# Patient Record
Sex: MALE | Race: WHITE | Employment: OTHER | URBAN - METROPOLITAN AREA
[De-identification: names, ages, dates, MRNs, and addresses within clinical notes are randomized per-mention and may not be internally consistent; named-entity substitution may affect disease eponyms.]

---

## 2017-03-16 ENCOUNTER — EMPLOYEE WELLNESS (OUTPATIENT)
Dept: OTHER | Age: 67
End: 2017-03-16

## 2017-03-16 LAB
CHOLESTEROL/HDL RATIO: 2
CHOLESTEROL: 155 MG/DL
GLUCOSE BLD-MCNC: 102 MG/DL (ref 70–99)
HDLC SERPL-MCNC: 78 MG/DL
LDL CHOLESTEROL: 64 MG/DL (ref 0–130)
PATIENT FASTING?: YES
TRIGL SERPL-MCNC: 64 MG/DL
VLDLC SERPL CALC-MCNC: 13 MG/DL (ref 1–30)

## 2017-06-15 ENCOUNTER — TELEPHONE (OUTPATIENT)
Dept: FAMILY MEDICINE CLINIC | Age: 67
End: 2017-06-15

## 2017-06-15 DIAGNOSIS — Z12.11 ENCOUNTER FOR SCREENING COLONOSCOPY: Primary | ICD-10-CM

## 2017-06-15 RX ORDER — LEVOTHYROXINE SODIUM 175 UG/1
175 TABLET ORAL
Qty: 90 TABLET | Refills: 1 | Status: SHIPPED | OUTPATIENT
Start: 2017-06-15 | End: 2017-11-21 | Stop reason: SDUPTHER

## 2017-07-12 ENCOUNTER — NURSE ONLY (OUTPATIENT)
Dept: FAMILY MEDICINE CLINIC | Age: 67
End: 2017-07-12
Payer: COMMERCIAL

## 2017-07-12 DIAGNOSIS — Z23 NEED FOR 23-POLYVALENT PNEUMOCOCCAL POLYSACCHARIDE VACCINE: Primary | ICD-10-CM

## 2017-07-12 PROCEDURE — 90732 PPSV23 VACC 2 YRS+ SUBQ/IM: CPT | Performed by: FAMILY MEDICINE

## 2017-07-12 PROCEDURE — 90471 IMMUNIZATION ADMIN: CPT | Performed by: FAMILY MEDICINE

## 2017-09-26 RX ORDER — ATORVASTATIN CALCIUM 40 MG/1
TABLET, FILM COATED ORAL
Qty: 90 TABLET | Refills: 0 | Status: SHIPPED | OUTPATIENT
Start: 2017-09-26 | End: 2017-11-15 | Stop reason: SDUPTHER

## 2017-11-02 LAB
ALT SERPL-CCNC: 28 U/L
AST SERPL-CCNC: 26 U/L
BASOPHILS ABSOLUTE: NORMAL /ΜL
BASOPHILS RELATIVE PERCENT: NORMAL %
BUN BLDV-MCNC: 13 MG/DL
CALCIUM SERPL-MCNC: 9.2 MG/DL
CHLORIDE BLD-SCNC: 101 MMOL/L
CHOLESTEROL, TOTAL: 155 MG/DL
CHOLESTEROL/HDL RATIO: 2
CO2: 22 MMOL/L
CREAT SERPL-MCNC: 1.08 MG/DL
EOSINOPHILS ABSOLUTE: NORMAL /ΜL
EOSINOPHILS RELATIVE PERCENT: NORMAL %
GFR CALCULATED: >60
GLUCOSE BLD-MCNC: 97 MG/DL
HCT VFR BLD CALC: 45.4 % (ref 41–53)
HDLC SERPL-MCNC: 79 MG/DL (ref 35–70)
HEMOGLOBIN: 15 G/DL (ref 13.5–17.5)
LDL CHOLESTEROL CALCULATED: 64 MG/DL (ref 0–160)
LYMPHOCYTES ABSOLUTE: NORMAL /ΜL
LYMPHOCYTES RELATIVE PERCENT: NORMAL %
MCH RBC QN AUTO: 28.7 PG
MCHC RBC AUTO-ENTMCNC: 33 G/DL
MCV RBC AUTO: 86.8 FL
MONOCYTES ABSOLUTE: NORMAL /ΜL
MONOCYTES RELATIVE PERCENT: NORMAL %
NEUTROPHILS ABSOLUTE: NORMAL /ΜL
NEUTROPHILS RELATIVE PERCENT: NORMAL %
PDW BLD-RTO: 13.5 %
PLATELET # BLD: 269 K/ΜL
PMV BLD AUTO: NORMAL FL
POTASSIUM SERPL-SCNC: 4.3 MMOL/L
PROSTATE SPECIFIC ANTIGEN: 2.47 NG/ML
RBC # BLD: 5.23 10^6/ΜL
SODIUM BLD-SCNC: 140 MMOL/L
TRIGL SERPL-MCNC: 58 MG/DL
VLDLC SERPL CALC-MCNC: ABNORMAL MG/DL
WBC # BLD: 8.6 10^3/ML

## 2017-11-10 LAB — TSH SERPL DL<=0.05 MIU/L-ACNC: 10.14 UIU/ML

## 2017-11-15 ENCOUNTER — OFFICE VISIT (OUTPATIENT)
Dept: FAMILY MEDICINE CLINIC | Age: 67
End: 2017-11-15
Payer: COMMERCIAL

## 2017-11-15 VITALS
SYSTOLIC BLOOD PRESSURE: 138 MMHG | DIASTOLIC BLOOD PRESSURE: 78 MMHG | WEIGHT: 199 LBS | BODY MASS INDEX: 29.47 KG/M2 | HEIGHT: 69 IN

## 2017-11-15 DIAGNOSIS — Z13.6 SCREENING FOR AAA (ABDOMINAL AORTIC ANEURYSM): ICD-10-CM

## 2017-11-15 DIAGNOSIS — E03.9 ACQUIRED HYPOTHYROIDISM: Primary | ICD-10-CM

## 2017-11-15 DIAGNOSIS — Z12.11 SCREEN FOR COLON CANCER: ICD-10-CM

## 2017-11-15 DIAGNOSIS — K27.9 PUD (PEPTIC ULCER DISEASE): ICD-10-CM

## 2017-11-15 DIAGNOSIS — B35.1 ONYCHOMYCOSIS: ICD-10-CM

## 2017-11-15 DIAGNOSIS — Z87.19 HISTORY OF ESOPHAGEAL STRICTURE: ICD-10-CM

## 2017-11-15 DIAGNOSIS — E78.00 PURE HYPERCHOLESTEROLEMIA: ICD-10-CM

## 2017-11-15 PROCEDURE — 99214 OFFICE O/P EST MOD 30 MIN: CPT | Performed by: FAMILY MEDICINE

## 2017-11-15 PROCEDURE — 3017F COLORECTAL CA SCREEN DOC REV: CPT | Performed by: FAMILY MEDICINE

## 2017-11-15 PROCEDURE — G8419 CALC BMI OUT NRM PARAM NOF/U: HCPCS | Performed by: FAMILY MEDICINE

## 2017-11-15 PROCEDURE — G8427 DOCREV CUR MEDS BY ELIG CLIN: HCPCS | Performed by: FAMILY MEDICINE

## 2017-11-15 PROCEDURE — 4040F PNEUMOC VAC/ADMIN/RCVD: CPT | Performed by: FAMILY MEDICINE

## 2017-11-15 PROCEDURE — G8484 FLU IMMUNIZE NO ADMIN: HCPCS | Performed by: FAMILY MEDICINE

## 2017-11-15 PROCEDURE — 1123F ACP DISCUSS/DSCN MKR DOCD: CPT | Performed by: FAMILY MEDICINE

## 2017-11-15 PROCEDURE — 1036F TOBACCO NON-USER: CPT | Performed by: FAMILY MEDICINE

## 2017-11-15 RX ORDER — SUCRALFATE 1 G/1
1 TABLET ORAL 4 TIMES DAILY
Qty: 360 TABLET | Refills: 1 | Status: SHIPPED | OUTPATIENT
Start: 2017-11-15 | End: 2019-01-07 | Stop reason: ALTCHOICE

## 2017-11-15 RX ORDER — ATORVASTATIN CALCIUM 40 MG/1
TABLET, FILM COATED ORAL
Qty: 90 TABLET | Refills: 1 | Status: SHIPPED | OUTPATIENT
Start: 2017-11-15 | End: 2018-04-09 | Stop reason: SDUPTHER

## 2017-11-15 RX ORDER — PANTOPRAZOLE SODIUM 40 MG/1
40 TABLET, DELAYED RELEASE ORAL 2 TIMES DAILY
Qty: 180 TABLET | Refills: 1 | Status: SHIPPED | OUTPATIENT
Start: 2017-11-15 | End: 2018-06-13 | Stop reason: SDUPTHER

## 2017-11-15 ASSESSMENT — ENCOUNTER SYMPTOMS: RESPIRATORY NEGATIVE: 1

## 2017-11-15 NOTE — PROGRESS NOTES
Taking? Authorizing Provider   atorvastatin (LIPITOR) 40 MG tablet Take 1 tablet by mouth daily 11/15/17  Yes Paulino Cordon DO   sucralfate (CARAFATE) 1 GM tablet Take 1 tablet by mouth 4 times daily 11/15/17 2/13/18 Yes Paulino Cordon DO   pantoprazole (PROTONIX) 40 MG tablet Take 1 tablet by mouth 2 times daily 11/15/17  Yes Paulino Cordon DO   ciclopirox (PENLAC) 8 % solution Apply topically nightly. 11/15/17  Yes Paulino Cordon DO   levothyroxine (SYNTHROID) 175 MCG tablet Take 1 tablet by mouth every morning (before breakfast) 6/15/17  Yes Paulino Cordon DO   ranitidine (ZANTAC) 150 MG tablet Take 150 mg by mouth nightly   Yes Historical Provider, MD   diclofenac sodium (VOLTAREN) 1 % GEL Apply 2 g topically 4 times daily as needed for Pain 11/22/16  Yes Paulino Cordon DO   Nerve Stimulator (EMJOI TENS) JOSI Use as directed. Include 2 sets of pads, with 3 refills  Indications: Lower Backache 11/28/11  Yes Aris Mitchell MD        Allergies:       Erythromycin and Isosorbide nitrate    Social History:     Tobacco:    reports that he quit smoking about 2 years ago. He has quit using smokeless tobacco.  Alcohol:      reports that he drinks alcohol. Drug Use:  reports that he does not use drugs. Family History:     Family History   Problem Relation Age of Onset    Lupus Father     Heart Disease Father     Heart Attack Father     High Cholesterol Brother        Review of Systems:     Positive and Negative as described in HPI    Review of Systems   Constitutional: Negative. Respiratory: Negative. Cardiovascular: Negative. Physical Exam:     Vitals:  /78   Ht 5' 9\" (1.753 m)   Wt 199 lb (90.3 kg)   BMI 29.39 kg/m²   Physical Exam   Constitutional: He is oriented to person, place, and time. He appears well-developed and well-nourished. He is cooperative.    HENT:   Right Ear: Hearing and tympanic membrane normal.   Left Ear: Hearing and tympanic membrane normal.   Nose: Nose normal.   Mouth/Throat: Oropharynx is clear and moist and mucous membranes are normal. Normal dentition. Eyes: Conjunctivae and EOM are normal. Pupils are equal, round, and reactive to light. Neck: No thyroid mass and no thyromegaly present. Cardiovascular: Normal rate, regular rhythm, S1 normal and S2 normal.    No murmur heard. No peripheral edema. Pulmonary/Chest: Effort normal and breath sounds normal.   Abdominal: Soft. Bowel sounds are normal. There is no hepatosplenomegaly. There is no tenderness. Musculoskeletal:   Muscles of normal tone and bulk. Normal gait. Lymphadenopathy:     He has no cervical adenopathy. Neurological: He is alert and oriented to person, place, and time. He has normal strength. Skin: Skin is warm and dry. No rash noted. Examine toenails of right foot but not left foot. Right toenails all thickened, opaque, yellow, with onycholysis at the free edge. Psychiatric: He has a normal mood and affect. His behavior is normal.   Nursing note and vitals reviewed. Data:     Lab Results   Component Value Date     11/02/2017    K 4.3 11/02/2017     11/02/2017    CO2 22 11/02/2017    BUN 13 11/02/2017    CREATININE 1.08 11/02/2017    GLUCOSE 97 11/02/2017    GLUCOSE 94 11/09/2011    LABALBU 4.2 12/20/2012    LABALBU 3.9 11/09/2011    BILITOT 1.16 12/20/2012    ALKPHOS 77 12/20/2012    AST 26 11/02/2017    ALT 28 11/02/2017     Lab Results   Component Value Date    WBC 8.6 11/02/2017    RBC 5.23 11/02/2017    RBC 4.91 11/09/2011    HGB 15.0 11/02/2017    HCT 45.4 11/02/2017    MCV 86.8 11/02/2017    MCH 28.7 11/02/2017    MCHC 33.0 11/02/2017    RDW 13.5 11/02/2017     11/02/2017     11/09/2011    MPV NOT REPORTED 10/21/2016     Lab Results   Component Value Date    TSH 10.14 11/10/2017     Lab Results   Component Value Date    CHOL 155 11/02/2017    HDL 79 11/02/2017    PSA 2.47 11/02/2017         Assessment & Plan:       1.  Acquired hypothyroidism  TSH with Reflex   2. Pure hypercholesterolemia     3. PUD (peptic ulcer disease)  Isabella Macias MD, General Surgery [facility]   4. History of esophageal stricture  Su Bosch MD, General Surgery [facility]   5. Onychomycosis     6. Screen for colon cancer  Su Bosch MD, General Surgery [facility]   7. Screening for AAA (abdominal aortic aneurysm)  US screening for AAA   Hypothyroid and controlled per recent TSH. Free T4 was not done. Patient is already on Synthroid dose that is above expected weight-based dosing. Weight-based dosing would be about 125 µg daily. For this reason, I'm hesitant to increase his dose. Advised instead that he take the Synthroid on its own, waiting about an hour before taking his proton pump inhibitor and Carafate. Recheck in 6 weeks. Hyperlipidemia in great control since starting Lipitor. Continue at current dose. Peptic ulcer disease which is been pretty severe and associated with esophageal stricture in the past.  Patient not having any symptoms of dysphagia now but is due for repeat EGD. Continue current medications and referred to surgeon for scope. penlac for toenails. Wash off with alcohol once a week. Requested Prescriptions     Signed Prescriptions Disp Refills    atorvastatin (LIPITOR) 40 MG tablet 90 tablet 1     Sig: Take 1 tablet by mouth daily    sucralfate (CARAFATE) 1 GM tablet 360 tablet 1     Sig: Take 1 tablet by mouth 4 times daily    pantoprazole (PROTONIX) 40 MG tablet 180 tablet 1     Sig: Take 1 tablet by mouth 2 times daily    ciclopirox (PENLAC) 8 % solution 1 Bottle 5     Sig: Apply topically nightly. Patient Instructions     SURVEY:    You may be receiving a survey from [employer] regarding your visit today. Please complete the survey to enable us to provide the highest quality of care to you and your family.     If you cannot score us as very good on any question, please call the office to

## 2017-11-15 NOTE — PATIENT INSTRUCTIONS
SURVEY:    You may be receiving a survey from [employer] regarding your visit today. Please complete the survey to enable us to provide the highest quality of care to you and your family. If you cannot score us as very good on any question, please call the office to discuss how we could have made your experience exceptional.     Thank you.

## 2017-11-21 RX ORDER — LEVOTHYROXINE SODIUM 175 UG/1
175 TABLET ORAL
Qty: 90 TABLET | Refills: 1 | Status: SHIPPED | OUTPATIENT
Start: 2017-11-21 | End: 2018-06-13 | Stop reason: SDUPTHER

## 2017-11-28 ENCOUNTER — HOSPITAL ENCOUNTER (OUTPATIENT)
Dept: ULTRASOUND IMAGING | Age: 67
Discharge: HOME | End: 2017-11-28
Payer: COMMERCIAL

## 2017-11-28 DIAGNOSIS — Z13.6 SCREENING FOR AAA (ABDOMINAL AORTIC ANEURYSM): ICD-10-CM

## 2017-11-28 PROCEDURE — 76706 US ABDL AORTA SCREEN AAA: CPT

## 2017-12-14 ENCOUNTER — INITIAL CONSULT (OUTPATIENT)
Dept: SURGERY | Age: 67
End: 2017-12-14

## 2017-12-14 VITALS
HEIGHT: 69 IN | WEIGHT: 201 LBS | BODY MASS INDEX: 29.77 KG/M2 | SYSTOLIC BLOOD PRESSURE: 117 MMHG | HEART RATE: 58 BPM | DIASTOLIC BLOOD PRESSURE: 70 MMHG

## 2017-12-14 DIAGNOSIS — Z01.818 PREPROCEDURAL EXAMINATION: ICD-10-CM

## 2017-12-14 DIAGNOSIS — Z87.11 HISTORY OF STOMACH ULCERS: ICD-10-CM

## 2017-12-14 DIAGNOSIS — Z12.11 ENCOUNTER FOR SCREENING COLONOSCOPY: Primary | ICD-10-CM

## 2017-12-14 DIAGNOSIS — Z87.19 HISTORY OF ESOPHAGITIS: ICD-10-CM

## 2017-12-14 PROCEDURE — 99024 POSTOP FOLLOW-UP VISIT: CPT | Performed by: SURGERY

## 2017-12-14 RX ORDER — SODIUM, POTASSIUM,MAG SULFATES 17.5-3.13G
1 SOLUTION, RECONSTITUTED, ORAL ORAL ONCE
Qty: 2 BOTTLE | Refills: 0 | Status: SHIPPED | OUTPATIENT
Start: 2017-12-14 | End: 2017-12-14

## 2018-01-15 ENCOUNTER — HOSPITAL ENCOUNTER (OUTPATIENT)
Age: 68
Discharge: HOME | End: 2018-01-15
Payer: COMMERCIAL

## 2018-01-15 DIAGNOSIS — Z12.11 ENCOUNTER FOR SCREENING COLONOSCOPY: ICD-10-CM

## 2018-01-15 DIAGNOSIS — Z01.818 PREPROCEDURAL EXAMINATION: ICD-10-CM

## 2018-01-15 LAB
EKG ATRIAL RATE: 61 BPM
EKG P AXIS: 58 DEGREES
EKG P-R INTERVAL: 162 MS
EKG Q-T INTERVAL: 410 MS
EKG QRS DURATION: 86 MS
EKG QTC CALCULATION (BAZETT): 412 MS
EKG R AXIS: -44 DEGREES
EKG T AXIS: 35 DEGREES
EKG VENTRICULAR RATE: 61 BPM

## 2018-01-15 PROCEDURE — 93005 ELECTROCARDIOGRAM TRACING: CPT

## 2018-01-16 ENCOUNTER — ANESTHESIA EVENT (OUTPATIENT)
Dept: OPERATING ROOM | Age: 68
End: 2018-01-16
Payer: COMMERCIAL

## 2018-01-22 ENCOUNTER — HOSPITAL ENCOUNTER (OUTPATIENT)
Age: 68
Setting detail: OUTPATIENT SURGERY
Discharge: HOME | End: 2018-01-22
Attending: SURGERY | Admitting: SURGERY
Payer: COMMERCIAL

## 2018-01-22 ENCOUNTER — ANESTHESIA (OUTPATIENT)
Dept: OPERATING ROOM | Age: 68
End: 2018-01-22
Payer: COMMERCIAL

## 2018-01-22 VITALS
HEIGHT: 68 IN | WEIGHT: 196 LBS | RESPIRATION RATE: 16 BRPM | DIASTOLIC BLOOD PRESSURE: 75 MMHG | TEMPERATURE: 96.7 F | OXYGEN SATURATION: 100 % | BODY MASS INDEX: 29.7 KG/M2 | HEART RATE: 48 BPM | SYSTOLIC BLOOD PRESSURE: 140 MMHG

## 2018-01-22 VITALS — SYSTOLIC BLOOD PRESSURE: 104 MMHG | OXYGEN SATURATION: 97 % | DIASTOLIC BLOOD PRESSURE: 66 MMHG

## 2018-01-22 PROBLEM — Z12.11 ENCOUNTER FOR SCREENING COLONOSCOPY: Status: ACTIVE | Noted: 2018-01-22

## 2018-01-22 PROCEDURE — 2580000003 HC RX 258: Performed by: SURGERY

## 2018-01-22 PROCEDURE — G0121 COLON CA SCRN NOT HI RSK IND: HCPCS | Performed by: SURGERY

## 2018-01-22 PROCEDURE — 6360000002 HC RX W HCPCS: Performed by: NURSE ANESTHETIST, CERTIFIED REGISTERED

## 2018-01-22 PROCEDURE — 88342 IMHCHEM/IMCYTCHM 1ST ANTB: CPT

## 2018-01-22 PROCEDURE — 3609012400 HC EGD TRANSORAL BIOPSY SINGLE/MULTIPLE: Performed by: SURGERY

## 2018-01-22 PROCEDURE — 7100000010 HC PHASE II RECOVERY - FIRST 15 MIN: Performed by: SURGERY

## 2018-01-22 PROCEDURE — 88305 TISSUE EXAM BY PATHOLOGIST: CPT

## 2018-01-22 PROCEDURE — 36415 COLL VENOUS BLD VENIPUNCTURE: CPT

## 2018-01-22 PROCEDURE — 3700000001 HC ADD 15 MINUTES (ANESTHESIA): Performed by: SURGERY

## 2018-01-22 PROCEDURE — 87077 CULTURE AEROBIC IDENTIFY: CPT

## 2018-01-22 PROCEDURE — 7100000011 HC PHASE II RECOVERY - ADDTL 15 MIN: Performed by: SURGERY

## 2018-01-22 PROCEDURE — 43239 EGD BIOPSY SINGLE/MULTIPLE: CPT | Performed by: SURGERY

## 2018-01-22 PROCEDURE — 6360000002 HC RX W HCPCS

## 2018-01-22 PROCEDURE — 82941 ASSAY OF GASTRIN: CPT

## 2018-01-22 PROCEDURE — 3700000000 HC ANESTHESIA ATTENDED CARE: Performed by: SURGERY

## 2018-01-22 PROCEDURE — 3609027000 HC COLONOSCOPY: Performed by: SURGERY

## 2018-01-22 RX ORDER — SODIUM CHLORIDE, SODIUM LACTATE, POTASSIUM CHLORIDE, CALCIUM CHLORIDE 600; 310; 30; 20 MG/100ML; MG/100ML; MG/100ML; MG/100ML
INJECTION, SOLUTION INTRAVENOUS CONTINUOUS
Status: DISCONTINUED | OUTPATIENT
Start: 2018-01-22 | End: 2018-01-22 | Stop reason: HOSPADM

## 2018-01-22 RX ORDER — 0.9 % SODIUM CHLORIDE 0.9 %
10 VIAL (ML) INJECTION EVERY 12 HOURS SCHEDULED
Status: DISCONTINUED | OUTPATIENT
Start: 2018-01-22 | End: 2018-01-22 | Stop reason: HOSPADM

## 2018-01-22 RX ORDER — MIDAZOLAM HYDROCHLORIDE 1 MG/ML
INJECTION INTRAMUSCULAR; INTRAVENOUS PRN
Status: DISCONTINUED | OUTPATIENT
Start: 2018-01-22 | End: 2018-01-22 | Stop reason: SDUPTHER

## 2018-01-22 RX ORDER — ACETAMINOPHEN 325 MG/1
650 TABLET ORAL EVERY 4 HOURS PRN
Status: DISCONTINUED | OUTPATIENT
Start: 2018-01-22 | End: 2018-01-22 | Stop reason: HOSPADM

## 2018-01-22 RX ORDER — ONDANSETRON 2 MG/ML
4 INJECTION INTRAMUSCULAR; INTRAVENOUS EVERY 6 HOURS PRN
Status: DISCONTINUED | OUTPATIENT
Start: 2018-01-22 | End: 2018-01-22 | Stop reason: HOSPADM

## 2018-01-22 RX ORDER — PROPOFOL 10 MG/ML
INJECTION, EMULSION INTRAVENOUS PRN
Status: DISCONTINUED | OUTPATIENT
Start: 2018-01-22 | End: 2018-01-22 | Stop reason: SDUPTHER

## 2018-01-22 RX ORDER — SODIUM CHLORIDE 0.9 % (FLUSH) 0.9 %
10 SYRINGE (ML) INJECTION PRN
Status: DISCONTINUED | OUTPATIENT
Start: 2018-01-22 | End: 2018-01-22 | Stop reason: HOSPADM

## 2018-01-22 RX ORDER — PROPOFOL 10 MG/ML
INJECTION, EMULSION INTRAVENOUS CONTINUOUS PRN
Status: DISCONTINUED | OUTPATIENT
Start: 2018-01-22 | End: 2018-01-22 | Stop reason: SDUPTHER

## 2018-01-22 RX ORDER — SODIUM CHLORIDE 0.9 % (FLUSH) 0.9 %
10 SYRINGE (ML) INJECTION EVERY 12 HOURS SCHEDULED
Status: DISCONTINUED | OUTPATIENT
Start: 2018-01-22 | End: 2018-01-22 | Stop reason: HOSPADM

## 2018-01-22 RX ORDER — 0.9 % SODIUM CHLORIDE 0.9 %
10 VIAL (ML) INJECTION PRN
Status: DISCONTINUED | OUTPATIENT
Start: 2018-01-22 | End: 2018-01-22 | Stop reason: HOSPADM

## 2018-01-22 RX ADMIN — MIDAZOLAM HYDROCHLORIDE 2 MG: 2 INJECTION, SOLUTION INTRAMUSCULAR; INTRAVENOUS at 08:14

## 2018-01-22 RX ADMIN — PROPOFOL 140 MCG/KG/MIN: 10 INJECTION, EMULSION INTRAVENOUS at 08:19

## 2018-01-22 RX ADMIN — PROPOFOL 50 MG: 10 INJECTION, EMULSION INTRAVENOUS at 08:17

## 2018-01-22 RX ADMIN — SODIUM CHLORIDE, POTASSIUM CHLORIDE, SODIUM LACTATE AND CALCIUM CHLORIDE: 600; 310; 30; 20 INJECTION, SOLUTION INTRAVENOUS at 07:44

## 2018-01-22 ASSESSMENT — ENCOUNTER SYMPTOMS
ABDOMINAL PAIN: 1
SORE THROAT: 0
NAUSEA: 0
COUGH: 0
BACK PAIN: 1
SHORTNESS OF BREATH: 0
CHOKING: 0
TROUBLE SWALLOWING: 0
VOMITING: 0
BLOOD IN STOOL: 0

## 2018-01-22 NOTE — PATIENT INSTRUCTIONS
Patient Education   Patient Education        Learning About Colonoscopy  What is a colonoscopy? A colonoscopy is a test (also called a procedure) that lets a doctor look inside your large intestine. The doctor uses a thin, lighted tube called a colonoscope. The doctor uses it to look for small growths called polyps, colon or rectal cancer (colorectal cancer), or other problems like bleeding. During the procedure, the doctor can take samples of tissue. The samples can then be checked for cancer or other conditions. The doctor can also take out polyps. How is colonoscopy done? This procedure is done in a doctor's office or a clinic or hospital. You will get medicine to help you relax and not feel pain. Some people find that they do not remember having the test because of the medicine. The doctor gently moves the colonoscope, or scope, through the colon. The scope is also a small video camera. It lets the doctor see the colon and take pictures. A colonoscopy usually takes 30 to 45 minutes. It may take longer if the doctor has to remove polyps. How do you prepare for the procedure? You need to clean out your colon before the procedure so the doctor can see all of your colon. You may start the cleaning process a day or two before the test. This depends on which \"colon prep\" your doctor recommends. To clean your colon, you stop eating solid foods and drink only clear liquids. You can have water, tea, coffee, clear juices, clear broths, flavored ice pops, and gelatin (such as Jell-O). Do not drink anything red or purple, such as grape juice or fruit punch. And do not eat red or purple foods, such as grape ice pops or cherry gelatin. The day or night before the procedure, you drink a large amount of a special liquid. This causes loose, frequent stools. You will go to the bathroom a lot. It is very important to drink all of the colon prep liquid. If you have problems drinking the liquid, call your doctor.   For that bends. It is called an endoscope, or scope. The doctor puts the tip of the scope in your mouth and gently moves it down your throat. The scope is a flexible video camera. The doctor looks at a monitor (like a TV set or a computer screen) as he or she moves the scope. A doctor may do this test, which is also called a procedure, to look for ulcers, tumors, infection, or bleeding. It also can be used to look for signs of acid backing up into your esophagus. This is called gastroesophageal reflux disease, or GERD. The doctor can use the scope to take a sample of tissue for study (a biopsy). The doctor also can use the scope to take out growths or stop bleeding. Follow-up care is a key part of your treatment and safety. Be sure to make and go to all appointments, and call your doctor if you are having problems. It's also a good idea to know your test results and keep a list of the medicines you take. What happens before the procedure? ?Preparing for the procedure  ? · Understand exactly what procedure is planned, along with the risks, benefits, and other options. · Tell your doctors ALL the medicines, vitamins, supplements, and herbal remedies you take. Some of these can increase the risk of bleeding or interact with anesthesia. ? · If you take blood thinners, such as warfarin (Coumadin), clopidogrel (Plavix), or aspirin, be sure to talk to your doctor. He or she will tell you if you should stop taking these medicines before your procedure. Make sure that you understand exactly what your doctor wants you to do.   ? · Your doctor will tell you which medicines to take or stop before your procedure. You may need to stop taking certain medicines a week or more before the procedure. So talk to your doctor as soon as you can.   ? · If you have an advance directive, let your doctor know. It may include a living will and a durable power of  for health care.  Bring a copy to the hospital. If you don't have

## 2018-01-22 NOTE — BRIEF OP NOTE
Brief Postoperative Note  ______________________________________________________________    Patient: Bren Major  YOB: 1950  MRN: [id number]  Date of Procedure: 1/22/2018    Pre-Op Diagnosis:      1. Screening colonoscopy     2. History gastritis, esophagitis    Post-Op Diagnosis:      1. Mild antral gastritis     2. Small hiatal hernia     3. Early sigmoid diverticulosis     4. Large perianal skin tag       Procedure(s):      1. EGD     2. Antral biopsies     3. Colonoscopy anus to cecum    Anesthesia: Anesthesia type not filed in the log. Surgeon(s):  Homa Bruno MD    Staff:  Domonique Pérez [employer]: Carolynn Jones     Estimated Blood Loss:  Less than 42EP    Complications: None    Specimens:        1. Antral biopsies    Findings:   As above. Continue PPI. Check gastrin. Consider excision of perianal skin tag.     Dictated # [id number]    Homa Bruno MD  Date: 1/22/2018  Time: 8:17 AM

## 2018-01-22 NOTE — PROGRESS NOTES
Mouth/Throat: Oropharynx is clear and moist.   Eyes: Conjunctivae and EOM are normal. Pupils are equal, round, and reactive to light. No scleral icterus. Neck: Normal range of motion. Neck supple. No JVD present. No tracheal deviation present. Cardiovascular: Normal rate and regular rhythm. Pulmonary/Chest: Effort normal and breath sounds normal. No respiratory distress. He exhibits no tenderness. Abdominal: Soft. Bowel sounds are normal. He exhibits no distension and no mass. There is no tenderness. There is no rebound and no guarding. Musculoskeletal: Normal range of motion. He exhibits no edema. Lymphadenopathy:     He has no cervical adenopathy. Neurological: He is alert and oriented to person, place, and time. No cranial nerve deficit. Skin: Skin is warm and dry. No rash noted. No erythema. Psychiatric: He has a normal mood and affect. His behavior is normal. Judgment and thought content normal.   Nursing note and vitals reviewed. 10/24/2016 11:55 AM - [place], Lab Incoming [employer] Results     Component Collected Lab   Surgical Pathology Report 10/21/2016  3:18 PM [address] [place] [address] Lab   [address] [place] [address]   CONSULTING PATHOLOGISTS [address] [place] [address]. [place], [address]   [phone]   Fax: [phone]     2 [facility]     Patient Name: Gerhardt Sabine [facility] Rec: [id number]   Path Number: [id number]   Collected: 10/21/2016   Received: 10/21/2016   Reported: 10/24/2016 11:55     -- Diagnosis --   GE JUNCTION, BIOPSY:       -  SQUAMOUS AND GLANDULAR MUCOSA PRESENT.       -  MILD CHRONIC INFLAMMATION.        Peter Jacome M.D.   **Electronically Signed Out**         als/10/24/2016       Clinical Information   Pre-op Diagnosis:  PEPTIC ULCER DISEASE; ESOPHAGEAL STENOSIS   Operative Findings:  BX'S  GE JUNCTION   Operation Performed:  EGD WITH BX'S & DILATION       Source of Specimen   1:

## 2018-01-22 NOTE — H&P
Family History   Problem Relation Age of Onset    Lupus Father      Heart Disease Father      Heart Attack Father      High Cholesterol Brother              Allergies:  See list     Current Facility-Administered Medications   Current Outpatient Prescriptions   Medication Sig Dispense Refill    levothyroxine (SYNTHROID) 175 MCG tablet Take 1 tablet by mouth every morning (before breakfast) 90 tablet 1    atorvastatin (LIPITOR) 40 MG tablet Take 1 tablet by mouth daily 90 tablet 1    sucralfate (CARAFATE) 1 GM tablet Take 1 tablet by mouth 4 times daily 360 tablet 1    pantoprazole (PROTONIX) 40 MG tablet Take 1 tablet by mouth 2 times daily 180 tablet 1    ciclopirox (PENLAC) 8 % solution Apply topically nightly. 1 Bottle 5    ranitidine (ZANTAC) 150 MG tablet Take 150 mg by mouth nightly        diclofenac sodium (VOLTAREN) 1 % GEL Apply 2 g topically 4 times daily as needed for Pain 100 g 0    Nerve Stimulator (EMJOI TENS) JOSI Use as directed.   Include 2 sets of pads, with 3 refills  Indications: Lower Backache 1 Device 0      No current facility-administered medications for this visit.                 Facility-Administered Medications Ordered in Other Visits   Medication Dose Route Frequency Provider Last Rate Last Dose    lactated ringers infusion   Intravenous Continuous Melody Esteban MD        pantoprazole (PROTONIX) injection 40 mg  40 mg Intravenous Once Melody Esteban MD                Social History   Social History            Social History    Marital status:        Spouse name: N/A    Number of children: N/A    Years of education: N/A             Social History Main Topics    Smoking status: Former Smoker       Quit date: 8/1/2015    Smokeless tobacco: Former User    Alcohol use 0.0 oz/week         Comment: 6 beers a week    Drug use: No    Sexual activity: Not Asked           Other Topics Concern    None          Social History Narrative    None       Objective:   Physical Exam   Constitutional: He is oriented to person, place, and time. He appears well-developed and well-nourished. HENT:   Head: Normocephalic and atraumatic. Mouth/Throat: Oropharynx is clear and moist.   Eyes: Conjunctivae and EOM are normal. Pupils are equal, round, and reactive to light. No scleral icterus. Neck: Normal range of motion. Neck supple. No JVD present. No tracheal deviation present. Cardiovascular: Normal rate and regular rhythm. Pulmonary/Chest: Effort normal and breath sounds normal. No respiratory distress. He exhibits no tenderness. Abdominal: Soft. Bowel sounds are normal. He exhibits no distension and no mass. There is no tenderness. There is no rebound and no guarding. Musculoskeletal: Normal range of motion. He exhibits no edema. Lymphadenopathy:     He has no cervical adenopathy. Neurological: He is alert and oriented to person, place, and time. No cranial nerve deficit. Skin: Skin is warm and dry. No rash noted. No erythema. Psychiatric: He has a normal mood and affect. His behavior is normal. Judgment and thought content normal.   Nursing note and vitals reviewed.     10/24/2016 11:55 AM - [place], Lab [employer]      Component Results      Component Collected Lab   Surgical Pathology Report 10/21/2016  3:18 PM [address] Lab   [address] [place] [address]   CONSULTING PATHOLOGISTS [address] [place] [address]. [place], [address]   [phone]   Fax: [phone]     3 [facility]     Patient Name: Myla Corado Med Rec: [id number]   Path Number: [id number]   Collected: 10/21/2016   Received: 10/21/2016   Reported: 10/24/2016 11:55     -- Diagnosis --   GE JUNCTION, BIOPSY:       -  SQUAMOUS AND GLANDULAR MUCOSA PRESENT.       -  MILD CHRONIC INFLAMMATION.        Robert Sage M.D.   **Electronically Signed Out**         als/10/24/2016       Clinical

## 2018-01-22 NOTE — OP NOTE
with Dr. Inga Stark at his next  regular appointment. My thanks to Dr. Inga Stark for the consultation.         CONNOR Berkowitz    D: 01/22/2018 8:51:00       T: 01/22/2018 11:39:10     [email or web address]  Job#: [id number]     Doc#: [id number]    CC:  Sarah Villeda

## 2018-01-23 LAB
DIRECT EXAM: NEGATIVE
DIRECT EXAM: NORMAL
DIRECT EXAM: NORMAL
Lab: NORMAL
SPECIMEN DESCRIPTION: NORMAL
STATUS: NORMAL

## 2018-01-24 LAB
GASTRIN: 16 PG/ML (ref 0–100)
SURGICAL PATHOLOGY REPORT: NORMAL

## 2018-01-29 ENCOUNTER — ANESTHESIA EVENT (OUTPATIENT)
Dept: OPERATING ROOM | Age: 68
End: 2018-01-29
Payer: COMMERCIAL

## 2018-01-29 ENCOUNTER — ANESTHESIA (OUTPATIENT)
Dept: OPERATING ROOM | Age: 68
End: 2018-01-29
Payer: COMMERCIAL

## 2018-01-29 ENCOUNTER — HOSPITAL ENCOUNTER (OUTPATIENT)
Age: 68
Setting detail: OUTPATIENT SURGERY
Discharge: HOME | End: 2018-01-29
Attending: SURGERY | Admitting: SURGERY
Payer: COMMERCIAL

## 2018-01-29 VITALS
HEIGHT: 68 IN | OXYGEN SATURATION: 97 % | SYSTOLIC BLOOD PRESSURE: 155 MMHG | RESPIRATION RATE: 16 BRPM | TEMPERATURE: 97.7 F | DIASTOLIC BLOOD PRESSURE: 79 MMHG | WEIGHT: 196 LBS | HEART RATE: 52 BPM | BODY MASS INDEX: 29.7 KG/M2

## 2018-01-29 VITALS
RESPIRATION RATE: 9 BRPM | SYSTOLIC BLOOD PRESSURE: 109 MMHG | DIASTOLIC BLOOD PRESSURE: 60 MMHG | OXYGEN SATURATION: 96 % | TEMPERATURE: 97.7 F

## 2018-01-29 DIAGNOSIS — K64.4 SKIN TAG OF PERIANAL REGION: Primary | ICD-10-CM

## 2018-01-29 PROCEDURE — 7100000011 HC PHASE II RECOVERY - ADDTL 15 MIN: Performed by: SURGERY

## 2018-01-29 PROCEDURE — 3600000013 HC SURGERY LEVEL 3 ADDTL 15MIN: Performed by: SURGERY

## 2018-01-29 PROCEDURE — 88304 TISSUE EXAM BY PATHOLOGIST: CPT

## 2018-01-29 PROCEDURE — 2580000003 HC RX 258: Performed by: SURGERY

## 2018-01-29 PROCEDURE — 46945 INT HRHC LIG 1 HROID W/O IMG: CPT | Performed by: SURGERY

## 2018-01-29 PROCEDURE — 7100000010 HC PHASE II RECOVERY - FIRST 15 MIN: Performed by: SURGERY

## 2018-01-29 PROCEDURE — 3700000000 HC ANESTHESIA ATTENDED CARE: Performed by: SURGERY

## 2018-01-29 PROCEDURE — 2500000003 HC RX 250 WO HCPCS: Performed by: SURGERY

## 2018-01-29 PROCEDURE — 3700000001 HC ADD 15 MINUTES (ANESTHESIA): Performed by: SURGERY

## 2018-01-29 PROCEDURE — 3600000003 HC SURGERY LEVEL 3 BASE: Performed by: SURGERY

## 2018-01-29 PROCEDURE — 6360000002 HC RX W HCPCS: Performed by: SURGERY

## 2018-01-29 PROCEDURE — 6360000002 HC RX W HCPCS: Performed by: NURSE ANESTHETIST, CERTIFIED REGISTERED

## 2018-01-29 PROCEDURE — 2500000003 HC RX 250 WO HCPCS: Performed by: NURSE ANESTHETIST, CERTIFIED REGISTERED

## 2018-01-29 RX ORDER — KETOROLAC TROMETHAMINE 30 MG/ML
INJECTION, SOLUTION INTRAMUSCULAR; INTRAVENOUS PRN
Status: DISCONTINUED | OUTPATIENT
Start: 2018-01-29 | End: 2018-01-29 | Stop reason: SDUPTHER

## 2018-01-29 RX ORDER — LIDOCAINE HYDROCHLORIDE 10 MG/ML
INJECTION, SOLUTION EPIDURAL; INFILTRATION; INTRACAUDAL; PERINEURAL PRN
Status: DISCONTINUED | OUTPATIENT
Start: 2018-01-29 | End: 2018-01-29 | Stop reason: SDUPTHER

## 2018-01-29 RX ORDER — ACETAMINOPHEN 325 MG/1
650 TABLET ORAL EVERY 4 HOURS PRN
Status: DISCONTINUED | OUTPATIENT
Start: 2018-01-29 | End: 2018-01-29 | Stop reason: HOSPADM

## 2018-01-29 RX ORDER — BUPIVACAINE HYDROCHLORIDE 2.5 MG/ML
INJECTION, SOLUTION INFILTRATION; PERINEURAL PRN
Status: DISCONTINUED | OUTPATIENT
Start: 2018-01-29 | End: 2018-01-29 | Stop reason: HOSPADM

## 2018-01-29 RX ORDER — FENTANYL CITRATE 50 UG/ML
INJECTION, SOLUTION INTRAMUSCULAR; INTRAVENOUS PRN
Status: DISCONTINUED | OUTPATIENT
Start: 2018-01-29 | End: 2018-01-29 | Stop reason: SDUPTHER

## 2018-01-29 RX ORDER — SODIUM CHLORIDE, SODIUM LACTATE, POTASSIUM CHLORIDE, CALCIUM CHLORIDE 600; 310; 30; 20 MG/100ML; MG/100ML; MG/100ML; MG/100ML
INJECTION, SOLUTION INTRAVENOUS CONTINUOUS
Status: DISCONTINUED | OUTPATIENT
Start: 2018-01-29 | End: 2018-01-29 | Stop reason: HOSPADM

## 2018-01-29 RX ORDER — MORPHINE SULFATE 1 MG/ML
1 INJECTION, SOLUTION EPIDURAL; INTRATHECAL; INTRAVENOUS
Status: DISCONTINUED | OUTPATIENT
Start: 2018-01-29 | End: 2018-01-29 | Stop reason: HOSPADM

## 2018-01-29 RX ORDER — MIDAZOLAM HYDROCHLORIDE 1 MG/ML
INJECTION INTRAMUSCULAR; INTRAVENOUS PRN
Status: DISCONTINUED | OUTPATIENT
Start: 2018-01-29 | End: 2018-01-29 | Stop reason: SDUPTHER

## 2018-01-29 RX ORDER — SODIUM CHLORIDE 0.9 % (FLUSH) 0.9 %
10 SYRINGE (ML) INJECTION EVERY 12 HOURS SCHEDULED
Status: DISCONTINUED | OUTPATIENT
Start: 2018-01-29 | End: 2018-01-29 | Stop reason: HOSPADM

## 2018-01-29 RX ORDER — ONDANSETRON 2 MG/ML
4 INJECTION INTRAMUSCULAR; INTRAVENOUS EVERY 6 HOURS PRN
Status: DISCONTINUED | OUTPATIENT
Start: 2018-01-29 | End: 2018-01-29 | Stop reason: HOSPADM

## 2018-01-29 RX ORDER — SODIUM CHLORIDE 0.9 % (FLUSH) 0.9 %
10 SYRINGE (ML) INJECTION PRN
Status: DISCONTINUED | OUTPATIENT
Start: 2018-01-29 | End: 2018-01-29 | Stop reason: HOSPADM

## 2018-01-29 RX ORDER — HYDROCODONE BITARTRATE AND ACETAMINOPHEN 5; 325 MG/1; MG/1
TABLET ORAL
Qty: 20 TABLET | Refills: 0 | Status: SHIPPED | OUTPATIENT
Start: 2018-01-29 | End: 2018-02-01

## 2018-01-29 RX ORDER — PROPOFOL 10 MG/ML
INJECTION, EMULSION INTRAVENOUS CONTINUOUS PRN
Status: DISCONTINUED | OUTPATIENT
Start: 2018-01-29 | End: 2018-01-29 | Stop reason: SDUPTHER

## 2018-01-29 RX ADMIN — PROPOFOL 75 MCG/KG/MIN: 10 INJECTION, EMULSION INTRAVENOUS at 11:10

## 2018-01-29 RX ADMIN — FENTANYL CITRATE 50 MCG: 50 INJECTION INTRAMUSCULAR; INTRAVENOUS at 11:10

## 2018-01-29 RX ADMIN — MIDAZOLAM 2 MG: 1 INJECTION INTRAMUSCULAR; INTRAVENOUS at 10:55

## 2018-01-29 RX ADMIN — SODIUM CHLORIDE, POTASSIUM CHLORIDE, SODIUM LACTATE AND CALCIUM CHLORIDE: 600; 310; 30; 20 INJECTION, SOLUTION INTRAVENOUS at 09:00

## 2018-01-29 RX ADMIN — LIDOCAINE HYDROCHLORIDE 4 ML: 10 INJECTION, SOLUTION EPIDURAL; INFILTRATION; INTRACAUDAL; PERINEURAL at 11:05

## 2018-01-29 RX ADMIN — KETOROLAC TROMETHAMINE 30 MG: 30 INJECTION, SOLUTION INTRAMUSCULAR at 11:30

## 2018-01-29 ASSESSMENT — PAIN SCALES - GENERAL
PAINLEVEL_OUTOF10: 0

## 2018-01-29 NOTE — ANESTHESIA PRE PROCEDURE
Department of Anesthesiology  Preprocedure Note       Name:  Dustin Olguin   Age:  78 y.o.  :  1950                                          MRN:  [id number]         Date:  2018      Surgeon: Marylin Gillette):  Gem Feldman MD    Procedure: Procedure(s):  ----EXCISION PERIANAL SKIN TAG    Medications prior to admission:   Prior to Admission medications    Medication Sig Start Date End Date Taking? Authorizing Provider   levothyroxine (SYNTHROID) 175 MCG tablet Take 1 tablet by mouth every morning (before breakfast) 17  Yes Casimer Prior, DO   atorvastatin (LIPITOR) 40 MG tablet Take 1 tablet by mouth daily 11/15/17  Yes Casimer Prior, DO   sucralfate (CARAFATE) 1 GM tablet Take 1 tablet by mouth 4 times daily 11/15/17 2/13/18 Yes Casimer Prior, DO   pantoprazole (PROTONIX) 40 MG tablet Take 1 tablet by mouth 2 times daily 11/15/17  Yes Casimer Prior, DO   ciclopirox (PENLAC) 8 % solution Apply topically nightly. 11/15/17  Yes Casimer Prior, DO   ranitidine (ZANTAC) 150 MG tablet Take 150 mg by mouth nightly   Yes Historical Provider, MD   diclofenac sodium (VOLTAREN) 1 % GEL Apply 2 g topically 4 times daily as needed for Pain 16  Yes Casimer Prior, DO   Nerve Stimulator (EMJOI TENS) JOSI Use as directed.   Include 2 sets of pads, with 3 refills  Indications: Lower Backache 11   Cyndie Campo MD       Current medications:    Current Facility-Administered Medications   Medication Dose Route Frequency Provider Last Rate Last Dose    lactated ringers infusion   Intravenous Continuous Gem Feldman  mL/hr at 18 0900      sodium chloride flush 0.9 % injection 10 mL  10 mL Intravenous 2 times per day Gem Feldman MD        sodium chloride flush 0.9 % injection 10 mL  10 mL Intravenous PRN Gem Feldman MD        ceFAZolin (ANCEF) 2 g in dextrose 3 % 50 mL IVPB (duplex)  2 g Intravenous On Call to Sinan Gorman MD         Facility-Administered

## 2018-01-29 NOTE — H&P
Other Topics Concern    None            Social History Narrative    None            Objective:   Physical Exam   Constitutional: He is oriented to person, place, and time. He appears well-developed and well-nourished. HENT:   Head: Normocephalic and atraumatic. Mouth/Throat: Oropharynx is clear and moist.   Eyes: Conjunctivae and EOM are normal. Pupils are equal, round, and reactive to light. No scleral icterus. Neck: Normal range of motion. Neck supple. No JVD present. No tracheal deviation present. Cardiovascular: Normal rate and regular rhythm.    Pulmonary/Chest: Effort normal and breath sounds normal. No respiratory distress. He exhibits no tenderness. Abdominal: Soft. Bowel sounds are normal. He exhibits no distension and no mass. There is no tenderness. There is no rebound and no guarding. Musculoskeletal: Normal range of motion. He exhibits no edema. Lymphadenopathy:     He has no cervical adenopathy. Neurological: He is alert and oriented to person, place, and time. No cranial nerve deficit. Skin: Skin is warm and dry. No rash noted. No erythema. Psychiatric: He has a normal mood and affect. His behavior is normal. Judgment and thought content normal.   Nursing note and vitals reviewed.     1  Assessment:      1.   Perianal skin tag     Plan:      Will proceed with excision of perianal skin tag.  Risks, benefits, alternatives thoroughly reviewed and accepted by Mr Grace Han, including remote risk of GI bleeding, perforation, missed lesions, recurrence, etc.

## 2018-01-30 NOTE — OP NOTE
[address]                               [place] [address]                                 OPERATIVE REPORT    PATIENT NAME: Renetta Anders                   :        1950  MED REC NO:   [id number]                              ROOM:  ACCOUNT NO:   [de-identified]                           ADMIT DATE: 2018  PROVIDER:     Juan C Shaffer    DATE OF PROCEDURE:  2018    ATTENDING SURGEON: Juan C Shaffer MD    PRIMARY CARE PHYSICIAN:  Stephanie Velasquez MD    PREOPERATIVE DIAGNOSIS:  Perianal skin tag. POSTOPERATIVE DIAGNOSES:  1. Perianal skin tag.  2.  Internal hemorrhoid. OPERATION:  1. Exam under anesthesia. 2.  Excision of perianal skin tag. 3.  Ligation of internal hemorrhoid. ANESTHESIA:  MAC and local.    INDICATIONS: The patient is a pleasant 60-year-old white male who recently  underwent a colonoscopy at which time, he was found to have a fairly  significant right-sided perianal skin tag. He returns at this time for  elective excision. OPERATIVE PROCEDURE:  After obtaining informed consent with discussion of  risks, benefits, and alternatives including a remote risk of bleeding,  infection, and recurrence, the patient was taken to the operating theater  and placed in supine position on the operating table. Following adequate  IV sedation, he was placed in lithotomy and prepped and draped in the  standard fashion. A digital rectal exam was performed. Sphincter tone was  normal.  Anal speculum was introduced. The anal canal was significant for  a right-sided perianal skin tag which was attached to a larger internal  hemorrhoid above. Lidocaine 1% with 0.25% of Marcaine was used topically  and anesthetized the skin and the subcutaneous tissues around the skin tag  and hemorrhoid.   With general traction of the skin tag, it was cauterized  at its tapered end near the dentate line at its

## 2018-01-31 LAB — DERMATOLOGY PATHOLOGY REPORT: NORMAL

## 2018-02-01 LAB — TSH SERPL DL<=0.05 MIU/L-ACNC: 1.29 UIU/ML

## 2018-02-15 ENCOUNTER — OFFICE VISIT (OUTPATIENT)
Dept: SURGERY | Age: 68
End: 2018-02-15
Payer: COMMERCIAL

## 2018-02-15 VITALS — HEIGHT: 68 IN | BODY MASS INDEX: 30.16 KG/M2 | WEIGHT: 199 LBS

## 2018-02-15 DIAGNOSIS — Z98.890 HISTORY OF COLONOSCOPY: Primary | ICD-10-CM

## 2018-02-15 DIAGNOSIS — K57.30 SIGMOID DIVERTICULOSIS: ICD-10-CM

## 2018-02-15 DIAGNOSIS — Z98.890 S/P ENDOSCOPY: Primary | ICD-10-CM

## 2018-02-15 PROCEDURE — 99213 OFFICE O/P EST LOW 20 MIN: CPT | Performed by: SURGERY

## 2018-02-19 ASSESSMENT — ENCOUNTER SYMPTOMS
ABDOMINAL PAIN: 0
NAUSEA: 0
CHOKING: 0
VOMITING: 0
BACK PAIN: 0
SHORTNESS OF BREATH: 0
COUGH: 0
SORE THROAT: 0
TROUBLE SWALLOWING: 0
BLOOD IN STOOL: 0

## 2018-02-19 NOTE — PATIENT INSTRUCTIONS
you urinate. ? · You have abnormal vaginal discharge. ? · You have shoulder pain. ? · You have cramping pain that does not get better when you have a bowel movement or pass gas. ? · You pass gas or stool from your urethra while urinating. ? Watch closely for changes in your health, and be sure to contact your doctor if you have any problems. Where can you learn more? Go to [email or web address] and sign in to your [employer] account. Enter Y702 in the Mitro box to learn more about \"Diverticulosis: Care Instructions. \"     If you do not have an account, please click on the \"Sign Up Now\" link. Current as of: May 12, 2017  Content Version: 11.5  © 0542-4744 [employer]. Care instructions adapted under license by [facility] ([facility]). If you have questions about a medical condition or this instruction, always ask your healthcare professional. [place] [address] any warranty or liability for your use of this information.

## 2018-02-19 NOTE — PROGRESS NOTES
Subjective:      Patient ID: Sanchez Robb is a 78 y.o. male. HPI     Mr Cassandra Mancia returns for follow up after EGD and colonoscopy showing mild chronic gastritis, small hiatal hernia, sigmoid diverticulosis. A large perianal skin tag with external hemorrhoid was also removed. He is doing well, without complaints. Review of Systems   Constitutional: Negative for activity change, appetite change, chills, fever and unexpected weight change. HENT: Negative for nosebleeds, sneezing, sore throat and trouble swallowing. Eyes: Negative for visual disturbance. Respiratory: Negative for cough, choking and shortness of breath. Cardiovascular: Negative for chest pain, palpitations and leg swelling. Gastrointestinal: Negative for abdominal pain, blood in stool, nausea and vomiting. Genitourinary: Negative for dysuria, flank pain and hematuria. Musculoskeletal: Negative for back pain, gait problem and myalgias. Allergic/Immunologic: Negative for immunocompromised state. Neurological: Negative for dizziness, seizures, syncope, weakness and headaches. Hematological: Does not bruise/bleed easily. Psychiatric/Behavioral: Negative for confusion and sleep disturbance.         Past Medical History:   Diagnosis Date    Chronic back pain     Hyperlipidemia     Hypothyroidism     Migraine headache     Multiple gastric ulcers     Osteoarthritis     Right wrist pain        Past Surgical History:   Procedure Laterality Date    CARDIAC CATHETERIZATION Left 08/25/2016    Right Radial/MHT/    COLONOSCOPY      [place] OFFICE/OUTPT VISIT,PROCEDURE ONLY N/A 1/22/2018    COLONOSCOPY performed by Maile Bustillos MD at [facility] OFFICE/OUTPT VISIT,PROCEDURE ONLY N/A 1/29/2018    ----EXCISION PERIANAL SKIN TAG performed by Maile Bustillos MD at [facility] ENDOSCOPY N/A 1/22/2018    EGD BIOPSY performed by Maile Bustillos MD at [address] [place] [address] Family History   Problem Relation Age of Onset    Lupus Father     Heart Disease Father     Heart Attack Father     High Cholesterol Brother        Allergies:  See list    Current Outpatient Prescriptions   Medication Sig Dispense Refill    levothyroxine (SYNTHROID) 175 MCG tablet Take 1 tablet by mouth every morning (before breakfast) 90 tablet 1    atorvastatin (LIPITOR) 40 MG tablet Take 1 tablet by mouth daily 90 tablet 1    sucralfate (CARAFATE) 1 GM tablet Take 1 tablet by mouth 4 times daily 360 tablet 1    pantoprazole (PROTONIX) 40 MG tablet Take 1 tablet by mouth 2 times daily 180 tablet 1    ciclopirox (PENLAC) 8 % solution Apply topically nightly. 1 Bottle 5    ranitidine (ZANTAC) 150 MG tablet Take 150 mg by mouth nightly      diclofenac sodium (VOLTAREN) 1 % GEL Apply 2 g topically 4 times daily as needed for Pain 100 g 0    Nerve Stimulator (EMJOI TENS) JOSI Use as directed. Include 2 sets of pads, with 3 refills  Indications: Lower Backache 1 Device 0     No current facility-administered medications for this visit. Facility-Administered Medications Ordered in Other Visits   Medication Dose Route Frequency Provider Last Rate Last Dose    lactated ringers infusion   Intravenous Continuous Marlon Armando MD        pantoprazole (PROTONIX) injection 40 mg  40 mg Intravenous Once Marlon Armando MD           Social History     Social History    Marital status:      Spouse name: N/A    Number of children: N/A    Years of education: N/A     Social History Main Topics    Smoking status: Former Smoker     Quit date: 8/1/2015    Smokeless tobacco: Former User    Alcohol use 0.0 oz/week      Comment: 6 beers a week    Drug use: No    Sexual activity: Not Asked     Other Topics Concern    None     Social History Narrative    None       Objective:   Physical Exam   Constitutional: He is oriented to person, place, and time.  He appears well-developed and Carlus Plank is a background lymphocytic and   plasma cell infiltrate. Carlus Plank is no evidence of intestinal metaplasia   or dysplasia.  The Helicobacter immunostain appears negative for   organisms (control stains appropriately). 1/24/2018  2:00 PM - Gabrielle Bedolla Incoming Lab Results From [facility]     Component Results     Component Value Ref Range & Units Status Collected Lab   Gastrin 16  0 - 100 pg/mL Final 01/22/2018  9:10 AM [facility]   (NOTE)      1/31/2018 10:23 AM - Gabrielle Bedolla Incoming Lab Results From [facility]     Component Results     Component Collected Lab   Dermatology Pathology Report 01/29/2018  8:46  [address]   (NOTE)   [address], [address]. [facility] [address]   [phone]   Fax: [phone] [address] [facility]     Patient Name: Michelle Saucedo Med Rec: [id number]   Path Number: [id number]   Collected: 1/29/2018   Received: 1/30/2018   Reported: 1/31/2018 10:22     -- Diagnosis --   SKIN, PERIANAL, BIOPSY:            -  REACTIVE EPIDERMAL HYPERPLASIA AND DILATED SUBMUCOSAL   VARICOSITIES, CONSISTENT WITH IRRITATED HEMORRHOID. Neelima Montoya M.D.   **Electronically Signed Out**   jet/1/30/2018       Clinical Information   Pre-op Diagnosis:  SKIN TAG   Operative Findings:  PERIANAL SKIN TAG   Operation Performed:  EXCISION     Source of Specimen   1: PERIANAL SKIN TAG (A)     Gross Description   \"JULIAN PEREZ, PERIANAL SKIN TAG\" 2.3 x 1.2 x 0.8 cm wrinkled tan   papule.  Inked and bisected 1cs.  tm       Microscopic Description   Sections show polypoid portion of skin and dermis with dilated   submucosal varicosities.  The epidermis is hyperplastic with   hyperparakeratosis and some vacuolization of the superficial   keratinocytes, but there is no evidence of dysplasia.  These features   are favored to represent reactive changes secondary to irritation.

## 2018-02-21 ENCOUNTER — TELEPHONE (OUTPATIENT)
Dept: FAMILY MEDICINE CLINIC | Age: 68
End: 2018-02-21

## 2018-03-20 VITALS — BODY MASS INDEX: 28.65 KG/M2 | WEIGHT: 194 LBS

## 2018-04-07 ENCOUNTER — OFFICE VISIT (OUTPATIENT)
Dept: PRIMARY CARE CLINIC | Age: 68
End: 2018-04-07
Payer: MEDICARE

## 2018-04-07 VITALS
TEMPERATURE: 98.6 F | OXYGEN SATURATION: 96 % | HEART RATE: 78 BPM | SYSTOLIC BLOOD PRESSURE: 148 MMHG | BODY MASS INDEX: 29.8 KG/M2 | DIASTOLIC BLOOD PRESSURE: 86 MMHG | WEIGHT: 196 LBS

## 2018-04-07 DIAGNOSIS — J02.0 STREP PHARYNGITIS: Primary | ICD-10-CM

## 2018-04-07 DIAGNOSIS — J02.9 SORE THROAT: ICD-10-CM

## 2018-04-07 LAB — S PYO AG THROAT QL: POSITIVE

## 2018-04-07 PROCEDURE — G8427 DOCREV CUR MEDS BY ELIG CLIN: HCPCS | Performed by: NURSE PRACTITIONER

## 2018-04-07 PROCEDURE — 87880 STREP A ASSAY W/OPTIC: CPT | Performed by: NURSE PRACTITIONER

## 2018-04-07 PROCEDURE — 99213 OFFICE O/P EST LOW 20 MIN: CPT | Performed by: NURSE PRACTITIONER

## 2018-04-07 PROCEDURE — 4040F PNEUMOC VAC/ADMIN/RCVD: CPT | Performed by: NURSE PRACTITIONER

## 2018-04-07 PROCEDURE — G8417 CALC BMI ABV UP PARAM F/U: HCPCS | Performed by: NURSE PRACTITIONER

## 2018-04-07 PROCEDURE — 3017F COLORECTAL CA SCREEN DOC REV: CPT | Performed by: NURSE PRACTITIONER

## 2018-04-07 PROCEDURE — 1123F ACP DISCUSS/DSCN MKR DOCD: CPT | Performed by: NURSE PRACTITIONER

## 2018-04-07 PROCEDURE — 1036F TOBACCO NON-USER: CPT | Performed by: NURSE PRACTITIONER

## 2018-04-07 RX ORDER — AMOXICILLIN 500 MG/1
500 CAPSULE ORAL EVERY 12 HOURS
Qty: 20 CAPSULE | Refills: 0 | Status: SHIPPED | OUTPATIENT
Start: 2018-04-07 | End: 2018-04-17

## 2018-04-07 RX ORDER — PREDNISOLONE ACETATE 10 MG/ML
SUSPENSION/ DROPS OPHTHALMIC
Refills: 1 | COMMUNITY
Start: 2018-03-30 | End: 2020-01-07

## 2018-04-07 RX ORDER — DICLOFENAC SODIUM 1 MG/ML
SOLUTION/ DROPS OPHTHALMIC
Refills: 1 | COMMUNITY
Start: 2018-03-30 | End: 2018-05-22 | Stop reason: ALTCHOICE

## 2018-04-07 RX ORDER — BESIFLOXACIN 6 MG/ML
SUSPENSION OPHTHALMIC
Refills: 1 | COMMUNITY
Start: 2018-03-30 | End: 2018-05-22 | Stop reason: ALTCHOICE

## 2018-04-07 ASSESSMENT — ENCOUNTER SYMPTOMS
SINUS PAIN: 0
SINUS PRESSURE: 0
TROUBLE SWALLOWING: 0
COUGH: 0
SORE THROAT: 1

## 2018-04-09 RX ORDER — ATORVASTATIN CALCIUM 40 MG/1
TABLET, FILM COATED ORAL
Qty: 90 TABLET | Refills: 1 | Status: SHIPPED | OUTPATIENT
Start: 2018-04-09 | End: 2018-07-17 | Stop reason: SDUPTHER

## 2018-04-11 PROBLEM — Z12.11 ENCOUNTER FOR SCREENING COLONOSCOPY: Status: RESOLVED | Noted: 2018-01-22 | Resolved: 2018-04-11

## 2018-05-22 ENCOUNTER — OFFICE VISIT (OUTPATIENT)
Dept: FAMILY MEDICINE CLINIC | Age: 68
End: 2018-05-22
Payer: MEDICARE

## 2018-05-22 VITALS
DIASTOLIC BLOOD PRESSURE: 70 MMHG | WEIGHT: 190 LBS | TEMPERATURE: 98.7 F | SYSTOLIC BLOOD PRESSURE: 110 MMHG | HEART RATE: 68 BPM | BODY MASS INDEX: 28.89 KG/M2 | OXYGEN SATURATION: 96 %

## 2018-05-22 DIAGNOSIS — J40 BRONCHITIS: Primary | ICD-10-CM

## 2018-05-22 PROCEDURE — G8427 DOCREV CUR MEDS BY ELIG CLIN: HCPCS | Performed by: FAMILY MEDICINE

## 2018-05-22 PROCEDURE — 4040F PNEUMOC VAC/ADMIN/RCVD: CPT | Performed by: FAMILY MEDICINE

## 2018-05-22 PROCEDURE — 3017F COLORECTAL CA SCREEN DOC REV: CPT | Performed by: FAMILY MEDICINE

## 2018-05-22 PROCEDURE — 1123F ACP DISCUSS/DSCN MKR DOCD: CPT | Performed by: FAMILY MEDICINE

## 2018-05-22 PROCEDURE — G8417 CALC BMI ABV UP PARAM F/U: HCPCS | Performed by: FAMILY MEDICINE

## 2018-05-22 PROCEDURE — 99213 OFFICE O/P EST LOW 20 MIN: CPT | Performed by: FAMILY MEDICINE

## 2018-05-22 PROCEDURE — 1036F TOBACCO NON-USER: CPT | Performed by: FAMILY MEDICINE

## 2018-05-22 RX ORDER — CEPHALEXIN 500 MG/1
500 CAPSULE ORAL 3 TIMES DAILY
Qty: 21 CAPSULE | Refills: 0 | Status: SHIPPED | OUTPATIENT
Start: 2018-05-22 | End: 2018-06-13 | Stop reason: ALTCHOICE

## 2018-05-22 RX ORDER — DEXTROMETHORPHAN HYDROBROMIDE AND PROMETHAZINE HYDROCHLORIDE 15; 6.25 MG/5ML; MG/5ML
5 SYRUP ORAL 4 TIMES DAILY PRN
Qty: 120 ML | Refills: 0 | Status: SHIPPED | OUTPATIENT
Start: 2018-05-22 | End: 2018-12-05 | Stop reason: ALTCHOICE

## 2018-05-22 ASSESSMENT — ENCOUNTER SYMPTOMS
RHINORRHEA: 1
NAUSEA: 1
SORE THROAT: 1
WHEEZING: 1
VOMITING: 0
COUGH: 1
SINUS PRESSURE: 1
SINUS PAIN: 1

## 2018-05-22 ASSESSMENT — PATIENT HEALTH QUESTIONNAIRE - PHQ9
SUM OF ALL RESPONSES TO PHQ9 QUESTIONS 1 & 2: 0
1. LITTLE INTEREST OR PLEASURE IN DOING THINGS: 0
2. FEELING DOWN, DEPRESSED OR HOPELESS: 0
SUM OF ALL RESPONSES TO PHQ QUESTIONS 1-9: 0

## 2018-06-13 ENCOUNTER — TELEPHONE (OUTPATIENT)
Dept: FAMILY MEDICINE CLINIC | Age: 68
End: 2018-06-13

## 2018-06-13 RX ORDER — LEVOTHYROXINE SODIUM 175 UG/1
175 TABLET ORAL
Qty: 90 TABLET | Refills: 1 | Status: SHIPPED | OUTPATIENT
Start: 2018-06-13 | End: 2018-12-05 | Stop reason: SDUPTHER

## 2018-06-13 RX ORDER — PANTOPRAZOLE SODIUM 40 MG/1
40 TABLET, DELAYED RELEASE ORAL 2 TIMES DAILY
Qty: 180 TABLET | Refills: 1 | Status: SHIPPED | OUTPATIENT
Start: 2018-06-13 | End: 2018-12-05 | Stop reason: SDUPTHER

## 2018-07-17 RX ORDER — TAMSULOSIN HYDROCHLORIDE 0.4 MG/1
0.4 CAPSULE ORAL NIGHTLY
Qty: 90 CAPSULE | Refills: 1 | Status: SHIPPED | OUTPATIENT
Start: 2018-07-17 | End: 2018-12-05 | Stop reason: SDUPTHER

## 2018-07-17 RX ORDER — ATORVASTATIN CALCIUM 40 MG/1
TABLET, FILM COATED ORAL
Qty: 90 TABLET | Refills: 1 | Status: SHIPPED | OUTPATIENT
Start: 2018-07-17 | End: 2018-12-05 | Stop reason: SDUPTHER

## 2018-10-18 ENCOUNTER — IMMUNIZATION (OUTPATIENT)
Dept: FAMILY MEDICINE CLINIC | Age: 68
End: 2018-10-18
Payer: MEDICARE

## 2018-10-18 DIAGNOSIS — Z23 NEED FOR INFLUENZA VACCINATION: Primary | ICD-10-CM

## 2018-10-18 PROCEDURE — 90686 IIV4 VACC NO PRSV 0.5 ML IM: CPT | Performed by: FAMILY MEDICINE

## 2018-10-18 PROCEDURE — G0008 ADMIN INFLUENZA VIRUS VAC: HCPCS | Performed by: FAMILY MEDICINE

## 2018-11-21 ENCOUNTER — HOSPITAL ENCOUNTER (OUTPATIENT)
Dept: GENERAL RADIOLOGY | Age: 68
Discharge: HOME | End: 2018-11-23
Payer: MEDICARE

## 2018-11-21 ENCOUNTER — HOSPITAL ENCOUNTER (OUTPATIENT)
Age: 68
Discharge: HOME | End: 2018-11-23
Payer: MEDICARE

## 2018-11-21 ENCOUNTER — TELEPHONE (OUTPATIENT)
Dept: FAMILY MEDICINE CLINIC | Age: 68
End: 2018-11-21

## 2018-11-21 DIAGNOSIS — Z12.5 PROSTATE CANCER SCREENING: ICD-10-CM

## 2018-11-21 DIAGNOSIS — M25.531 RIGHT WRIST PAIN: ICD-10-CM

## 2018-11-21 DIAGNOSIS — E78.00 PURE HYPERCHOLESTEROLEMIA: Primary | ICD-10-CM

## 2018-11-21 DIAGNOSIS — E03.9 ACQUIRED HYPOTHYROIDISM: ICD-10-CM

## 2018-11-21 PROCEDURE — 73110 X-RAY EXAM OF WRIST: CPT

## 2018-11-26 RX ORDER — HYDROCHLOROTHIAZIDE 25 MG/1
TABLET ORAL
Qty: 90 TABLET | Refills: 1 | Status: SHIPPED | OUTPATIENT
Start: 2018-11-26 | End: 2018-12-05 | Stop reason: ALTCHOICE

## 2018-11-29 ENCOUNTER — HOSPITAL ENCOUNTER (OUTPATIENT)
Age: 68
Discharge: HOME | End: 2018-11-29
Payer: MEDICARE

## 2018-11-29 DIAGNOSIS — E03.9 ACQUIRED HYPOTHYROIDISM: ICD-10-CM

## 2018-11-29 DIAGNOSIS — Z12.5 PROSTATE CANCER SCREENING: ICD-10-CM

## 2018-11-29 DIAGNOSIS — E78.00 PURE HYPERCHOLESTEROLEMIA: ICD-10-CM

## 2018-11-29 LAB
ALBUMIN SERPL-MCNC: 4.4 G/DL (ref 3.5–5.2)
ALBUMIN/GLOBULIN RATIO: ABNORMAL (ref 1–2.5)
ALP BLD-CCNC: 97 U/L (ref 40–129)
ALT SERPL-CCNC: 30 U/L (ref 5–41)
ANION GAP SERPL CALCULATED.3IONS-SCNC: 14 MMOL/L (ref 9–17)
AST SERPL-CCNC: 23 U/L
BILIRUB SERPL-MCNC: 2.15 MG/DL (ref 0.3–1.2)
BUN BLDV-MCNC: 16 MG/DL (ref 8–23)
BUN/CREAT BLD: 14 (ref 9–20)
CALCIUM SERPL-MCNC: 9.3 MG/DL (ref 8.6–10.4)
CHLORIDE BLD-SCNC: 102 MMOL/L (ref 98–107)
CHOLESTEROL/HDL RATIO: 2
CHOLESTEROL: 155 MG/DL
CO2: 23 MMOL/L (ref 20–31)
CREAT SERPL-MCNC: 1.11 MG/DL (ref 0.7–1.2)
GFR AFRICAN AMERICAN: >60 ML/MIN
GFR NON-AFRICAN AMERICAN: >60 ML/MIN
GFR SERPL CREATININE-BSD FRML MDRD: ABNORMAL ML/MIN/{1.73_M2}
GFR SERPL CREATININE-BSD FRML MDRD: ABNORMAL ML/MIN/{1.73_M2}
GLUCOSE BLD-MCNC: 102 MG/DL (ref 70–99)
HDLC SERPL-MCNC: 79 MG/DL
LDL CHOLESTEROL: 60 MG/DL (ref 0–130)
PATIENT FASTING?: YES
POTASSIUM SERPL-SCNC: 3.7 MMOL/L (ref 3.7–5.3)
PROSTATE SPECIFIC ANTIGEN: 1.98 UG/L
SODIUM BLD-SCNC: 139 MMOL/L (ref 135–144)
TOTAL PROTEIN: 7.3 G/DL (ref 6.4–8.3)
TRIGL SERPL-MCNC: 80 MG/DL
TSH SERPL DL<=0.05 MIU/L-ACNC: 1.51 MIU/L (ref 0.3–5)
VLDLC SERPL CALC-MCNC: NORMAL MG/DL (ref 1–30)

## 2018-11-29 PROCEDURE — 80061 LIPID PANEL: CPT

## 2018-11-29 PROCEDURE — 36415 COLL VENOUS BLD VENIPUNCTURE: CPT

## 2018-11-29 PROCEDURE — 84443 ASSAY THYROID STIM HORMONE: CPT

## 2018-11-29 PROCEDURE — 80053 COMPREHEN METABOLIC PANEL: CPT

## 2018-11-29 PROCEDURE — G0103 PSA SCREENING: HCPCS

## 2018-12-05 ENCOUNTER — OFFICE VISIT (OUTPATIENT)
Dept: FAMILY MEDICINE CLINIC | Age: 68
End: 2018-12-05
Payer: MEDICARE

## 2018-12-05 VITALS
DIASTOLIC BLOOD PRESSURE: 80 MMHG | BODY MASS INDEX: 29.7 KG/M2 | SYSTOLIC BLOOD PRESSURE: 120 MMHG | WEIGHT: 196 LBS | HEIGHT: 68 IN

## 2018-12-05 DIAGNOSIS — R21 RASH: Primary | ICD-10-CM

## 2018-12-05 DIAGNOSIS — K27.9 PUD (PEPTIC ULCER DISEASE): ICD-10-CM

## 2018-12-05 DIAGNOSIS — E03.9 ACQUIRED HYPOTHYROIDISM: ICD-10-CM

## 2018-12-05 DIAGNOSIS — E78.00 PURE HYPERCHOLESTEROLEMIA: ICD-10-CM

## 2018-12-05 PROCEDURE — 1101F PT FALLS ASSESS-DOCD LE1/YR: CPT | Performed by: FAMILY MEDICINE

## 2018-12-05 PROCEDURE — 1123F ACP DISCUSS/DSCN MKR DOCD: CPT | Performed by: FAMILY MEDICINE

## 2018-12-05 PROCEDURE — G8482 FLU IMMUNIZE ORDER/ADMIN: HCPCS | Performed by: FAMILY MEDICINE

## 2018-12-05 PROCEDURE — 1036F TOBACCO NON-USER: CPT | Performed by: FAMILY MEDICINE

## 2018-12-05 PROCEDURE — G8417 CALC BMI ABV UP PARAM F/U: HCPCS | Performed by: FAMILY MEDICINE

## 2018-12-05 PROCEDURE — 4040F PNEUMOC VAC/ADMIN/RCVD: CPT | Performed by: FAMILY MEDICINE

## 2018-12-05 PROCEDURE — G8427 DOCREV CUR MEDS BY ELIG CLIN: HCPCS | Performed by: FAMILY MEDICINE

## 2018-12-05 PROCEDURE — 3017F COLORECTAL CA SCREEN DOC REV: CPT | Performed by: FAMILY MEDICINE

## 2018-12-05 PROCEDURE — 99214 OFFICE O/P EST MOD 30 MIN: CPT | Performed by: FAMILY MEDICINE

## 2018-12-05 RX ORDER — SUCRALFATE 1 G/1
1 TABLET ORAL 4 TIMES DAILY
Qty: 360 TABLET | Refills: 1 | Status: CANCELLED | OUTPATIENT
Start: 2018-12-05 | End: 2019-03-05

## 2018-12-05 RX ORDER — LEVOTHYROXINE SODIUM 175 UG/1
175 TABLET ORAL
Qty: 90 TABLET | Refills: 1 | Status: SHIPPED | OUTPATIENT
Start: 2018-12-05 | End: 2019-06-14 | Stop reason: SDUPTHER

## 2018-12-05 RX ORDER — TAMSULOSIN HYDROCHLORIDE 0.4 MG/1
0.4 CAPSULE ORAL NIGHTLY
Qty: 90 CAPSULE | Refills: 1 | Status: SHIPPED | OUTPATIENT
Start: 2018-12-05 | End: 2019-12-13 | Stop reason: SDUPTHER

## 2018-12-05 RX ORDER — ATORVASTATIN CALCIUM 40 MG/1
TABLET, FILM COATED ORAL
Qty: 90 TABLET | Refills: 1 | Status: SHIPPED | OUTPATIENT
Start: 2018-12-05 | End: 2019-10-21 | Stop reason: SDUPTHER

## 2018-12-05 RX ORDER — FLUOCINOLONE ACETONIDE 0.1 MG/ML
SOLUTION TOPICAL
Qty: 60 ML | Refills: 0 | Status: SHIPPED | OUTPATIENT
Start: 2018-12-05 | End: 2020-01-07

## 2018-12-05 RX ORDER — PANTOPRAZOLE SODIUM 40 MG/1
40 TABLET, DELAYED RELEASE ORAL 2 TIMES DAILY
Qty: 180 TABLET | Refills: 0 | Status: SHIPPED | OUTPATIENT
Start: 2018-12-05 | End: 2019-03-27 | Stop reason: SDUPTHER

## 2018-12-05 ASSESSMENT — PATIENT HEALTH QUESTIONNAIRE - PHQ9
SUM OF ALL RESPONSES TO PHQ QUESTIONS 1-9: 0
2. FEELING DOWN, DEPRESSED OR HOPELESS: 0
SUM OF ALL RESPONSES TO PHQ9 QUESTIONS 1 & 2: 0
1. LITTLE INTEREST OR PLEASURE IN DOING THINGS: 0
SUM OF ALL RESPONSES TO PHQ QUESTIONS 1-9: 0

## 2018-12-05 ASSESSMENT — ENCOUNTER SYMPTOMS
RESPIRATORY NEGATIVE: 1
GASTROINTESTINAL NEGATIVE: 1

## 2018-12-05 NOTE — PATIENT INSTRUCTIONS
SURVEY:    You may be receiving a survey from [facility] regarding your visit today. Please complete the survey to enable us to provide the highest quality of care to you and your family. If you cannot score us as very good on any question, please call the office to discuss how we could have made your experience exceptional.     Thank you.

## 2019-01-07 ENCOUNTER — OFFICE VISIT (OUTPATIENT)
Dept: FAMILY MEDICINE CLINIC | Age: 69
End: 2019-01-07
Payer: MEDICARE

## 2019-01-07 VITALS
BODY MASS INDEX: 29.4 KG/M2 | WEIGHT: 194 LBS | SYSTOLIC BLOOD PRESSURE: 128 MMHG | HEIGHT: 68 IN | DIASTOLIC BLOOD PRESSURE: 80 MMHG

## 2019-01-07 DIAGNOSIS — E66.3 OVERWEIGHT: ICD-10-CM

## 2019-01-07 DIAGNOSIS — B35.4 TINEA CORPORIS: ICD-10-CM

## 2019-01-07 DIAGNOSIS — L40.9 SCALP PSORIASIS: Primary | ICD-10-CM

## 2019-01-07 PROCEDURE — G8482 FLU IMMUNIZE ORDER/ADMIN: HCPCS | Performed by: FAMILY MEDICINE

## 2019-01-07 PROCEDURE — 99213 OFFICE O/P EST LOW 20 MIN: CPT | Performed by: FAMILY MEDICINE

## 2019-01-07 PROCEDURE — 1123F ACP DISCUSS/DSCN MKR DOCD: CPT | Performed by: FAMILY MEDICINE

## 2019-01-07 PROCEDURE — G8417 CALC BMI ABV UP PARAM F/U: HCPCS | Performed by: FAMILY MEDICINE

## 2019-01-07 PROCEDURE — 4040F PNEUMOC VAC/ADMIN/RCVD: CPT | Performed by: FAMILY MEDICINE

## 2019-01-07 PROCEDURE — 1101F PT FALLS ASSESS-DOCD LE1/YR: CPT | Performed by: FAMILY MEDICINE

## 2019-01-07 PROCEDURE — G8427 DOCREV CUR MEDS BY ELIG CLIN: HCPCS | Performed by: FAMILY MEDICINE

## 2019-01-07 PROCEDURE — 3017F COLORECTAL CA SCREEN DOC REV: CPT | Performed by: FAMILY MEDICINE

## 2019-01-07 PROCEDURE — 1036F TOBACCO NON-USER: CPT | Performed by: FAMILY MEDICINE

## 2019-01-07 ASSESSMENT — PATIENT HEALTH QUESTIONNAIRE - PHQ9
SUM OF ALL RESPONSES TO PHQ9 QUESTIONS 1 & 2: 0
SUM OF ALL RESPONSES TO PHQ QUESTIONS 1-9: 0
2. FEELING DOWN, DEPRESSED OR HOPELESS: 0
1. LITTLE INTEREST OR PLEASURE IN DOING THINGS: 0
SUM OF ALL RESPONSES TO PHQ QUESTIONS 1-9: 0

## 2019-01-07 ASSESSMENT — ENCOUNTER SYMPTOMS
RESPIRATORY NEGATIVE: 1
GASTROINTESTINAL NEGATIVE: 1

## 2019-03-27 RX ORDER — PANTOPRAZOLE SODIUM 40 MG/1
TABLET, DELAYED RELEASE ORAL
Qty: 180 TABLET | Refills: 0 | Status: SHIPPED | OUTPATIENT
Start: 2019-03-27 | End: 2019-06-13 | Stop reason: SDUPTHER

## 2019-06-13 NOTE — TELEPHONE ENCOUNTER
Synthroid 175 mcg  protonix 40 mg    Dm-noa    Health Maintenance   Topic Date Due    Hepatitis C screen  1950    DTaP/Tdap/Td vaccine (1 - Tdap) 04/21/1970    Shingles Vaccine (2 of 3) 07/05/2016    TSH testing  11/29/2019    Diabetes screen  11/29/2021    Colon cancer screen colonoscopy  01/22/2023    Lipid screen  02/21/2024    Flu vaccine  Completed    Pneumococcal 65+ years Vaccine  Completed    AAA screen  Completed             (applicable per patient's age: Cancer Screenings, Depression Screening, Fall Risk Screening, Immunizations)    LDL Cholesterol (mg/dL)   Date Value   11/29/2018 60     LDL Calculated (mg/dL)   Date Value   11/02/2017 64     AST (U/L)   Date Value   11/29/2018 23     ALT (U/L)   Date Value   11/29/2018 30     BUN (mg/dL)   Date Value   11/29/2018 16      (goal A1C is < 7)   (goal LDL is <100) need 30-50% reduction from baseline     BP Readings from Last 3 Encounters:   01/07/19 128/80   12/05/18 120/80   05/22/18 110/70    (goal /80)      All Future Testing planned in [facility]:  Lab Frequency Next Occurrence   Vital signs     Activity as tolerated         Next Visit Date:  No future appointments.          Patient Active Problem List:     Hypothyroidism     Pure hypercholesterolemia     Arthritis     Tendinitis of right wrist     PUD (peptic ulcer disease)     Skin tag of perianal region     Scalp psoriasis

## 2019-06-13 NOTE — TELEPHONE ENCOUNTER
Last visit:  1/7/2019  Next Visit Date:  No future appointments. Last Med refill:    Medication List:  Prior to Admission medications    Medication Sig Start Date End Date Taking? Authorizing Provider   pantoprazole (PROTONIX) 40 MG tablet TAKE 1 TABLET BY MOUTH TWICE DAILY 3/27/19   [facility], DO   levothyroxine (SYNTHROID) 175 MCG tablet Take 1 tablet by mouth every morning (before breakfast) 12/5/18   [facility], DO   atorvastatin (LIPITOR) 40 MG tablet Take 1 tablet by mouth daily 12/5/18   [facility], DO   tamsulosin (FLOMAX) 0.4 MG capsule Take 1 capsule by mouth nightly 12/5/18   [facility], DO   fluocinolone acetonide (SYNALAR) 0.01 % external solution Apply topically 2 times daily to back of neck as needed for rash 12/5/18   [facility], DO   prednisoLONE acetate (PRED FORTE) 1 % ophthalmic suspension INSTILL 1 DROP IN THE LEFT EYE 6 (SIX) times a day as directed 3/30/18   Historical Provider, MD   lidocaine viscous (XYLOCAINE) 2 % solution Take 15 mLs by mouth as needed for Irritation or Pain Gargle and spit. Do not swallow. 4/7/18   Rhonda Ritter APRN - CNP   ranitidine (ZANTAC) 150 MG tablet Take 150 mg by mouth nightly    Historical Provider, MD   Nerve Stimulator (EMJOI TENS) JOSI Use as directed.   Include 2 sets of pads, with 3 refills  Indications: Lower Backache 11/28/11   Mauri Whitfield MD       Allergies:  Erythromycin and Isosorbide nitrate    No results found for: LABA1C          ( goal A1C is < 7)   No results found for: LABMICR  LDL Cholesterol (mg/dL)   Date Value   11/29/2018 60   03/16/2017 64     LDL Calculated (mg/dL)   Date Value   11/02/2017 64   05/05/2016 158       (goal LDL is <100)   AST (U/L)   Date Value   11/29/2018 23     ALT (U/L)   Date Value   11/29/2018 30     BUN (mg/dL)   Date Value   11/29/2018 16     BP Readings from Last 3 Encounters:   01/07/19 128/80   12/05/18 120/80   05/22/18 110/70          (goal 120/80)

## 2019-06-14 RX ORDER — LEVOTHYROXINE SODIUM 175 UG/1
175 TABLET ORAL
Qty: 90 TABLET | Refills: 1 | Status: SHIPPED | OUTPATIENT
Start: 2019-06-14 | End: 2019-12-13 | Stop reason: SDUPTHER

## 2019-06-14 RX ORDER — PANTOPRAZOLE SODIUM 40 MG/1
TABLET, DELAYED RELEASE ORAL
Qty: 180 TABLET | Refills: 1 | Status: SHIPPED | OUTPATIENT
Start: 2019-06-14 | End: 2019-12-13 | Stop reason: SDUPTHER

## 2019-10-21 RX ORDER — ATORVASTATIN CALCIUM 40 MG/1
TABLET, FILM COATED ORAL
Qty: 90 TABLET | Refills: 1 | Status: SHIPPED | OUTPATIENT
Start: 2019-10-21 | End: 2020-04-12 | Stop reason: SDUPTHER

## 2019-12-13 RX ORDER — LEVOTHYROXINE SODIUM 175 UG/1
175 TABLET ORAL
Qty: 90 TABLET | Refills: 1 | Status: SHIPPED | OUTPATIENT
Start: 2019-12-13 | End: 2020-01-10

## 2019-12-13 RX ORDER — PANTOPRAZOLE SODIUM 40 MG/1
TABLET, DELAYED RELEASE ORAL
Qty: 180 TABLET | Refills: 1 | Status: SHIPPED | OUTPATIENT
Start: 2019-12-13 | End: 2020-02-04 | Stop reason: SDUPTHER

## 2019-12-13 RX ORDER — TAMSULOSIN HYDROCHLORIDE 0.4 MG/1
0.4 CAPSULE ORAL NIGHTLY
Qty: 90 CAPSULE | Refills: 1 | Status: SHIPPED | OUTPATIENT
Start: 2019-12-13 | End: 2020-08-25 | Stop reason: SDUPTHER

## 2020-01-07 ENCOUNTER — OFFICE VISIT (OUTPATIENT)
Dept: FAMILY MEDICINE CLINIC | Age: 70
End: 2020-01-07
Payer: MEDICARE

## 2020-01-07 VITALS
HEIGHT: 68 IN | BODY MASS INDEX: 28.95 KG/M2 | SYSTOLIC BLOOD PRESSURE: 122 MMHG | HEART RATE: 59 BPM | WEIGHT: 191 LBS | OXYGEN SATURATION: 97 % | DIASTOLIC BLOOD PRESSURE: 80 MMHG

## 2020-01-07 PROCEDURE — G8482 FLU IMMUNIZE ORDER/ADMIN: HCPCS | Performed by: FAMILY MEDICINE

## 2020-01-07 PROCEDURE — 3017F COLORECTAL CA SCREEN DOC REV: CPT | Performed by: FAMILY MEDICINE

## 2020-01-07 PROCEDURE — G0438 PPPS, INITIAL VISIT: HCPCS | Performed by: FAMILY MEDICINE

## 2020-01-07 PROCEDURE — 4040F PNEUMOC VAC/ADMIN/RCVD: CPT | Performed by: FAMILY MEDICINE

## 2020-01-07 PROCEDURE — 1123F ACP DISCUSS/DSCN MKR DOCD: CPT | Performed by: FAMILY MEDICINE

## 2020-01-07 ASSESSMENT — LIFESTYLE VARIABLES
AUDIT-C TOTAL SCORE: 1
HOW OFTEN DO YOU HAVE SIX OR MORE DRINKS ON ONE OCCASION: 0
HOW MANY STANDARD DRINKS CONTAINING ALCOHOL DO YOU HAVE ON A TYPICAL DAY: 0
HOW OFTEN DO YOU HAVE A DRINK CONTAINING ALCOHOL: 1

## 2020-01-07 ASSESSMENT — PATIENT HEALTH QUESTIONNAIRE - PHQ9
SUM OF ALL RESPONSES TO PHQ QUESTIONS 1-9: 0
SUM OF ALL RESPONSES TO PHQ QUESTIONS 1-9: 0

## 2020-01-07 NOTE — PROGRESS NOTES
Medicare Annual Wellness Visit  Name: Kareen Thakur Date: 2020   MRN: [id number] Sex: Male   Age: 77 y.o. Ethnicity: Non-/Non    : 1950 Race: Rj Juarez is here for Medicare AWV and Hypothyroidism    Screenings for behavioral, psychosocial and functional/safety risks, and cognitive dysfunction are all negative except as indicated below. These results, as well as other patient data from the [address] [employer] [address] form, are documented in Flowsheets linked to this Encounter. Allergies   Allergen Reactions    Erythromycin Other (See Comments)     Swelling of mouth, mouth sores    Isosorbide Nitrate Other (See Comments)     Migraine headaches       Prior to Visit Medications    Medication Sig Taking? Authorizing Provider   pantoprazole (PROTONIX) 40 MG tablet TAKE 1 TABLET BY MOUTH TWICE DAILY Yes Anthony Cho, DO   levothyroxine (SYNTHROID) 175 MCG tablet Take 1 tablet by mouth every morning (before breakfast) Yes Anthony Cho, DO   tamsulosin (FLOMAX) 0.4 MG capsule Take 1 capsule by mouth nightly Yes Anthony Cho, DO   atorvastatin (LIPITOR) 40 MG tablet Take 1 tablet by mouth daily Yes Anthony Cho, DO   Nerve Stimulator (EMJOI TENS) JOSI Use as directed.   Include 2 sets of pads, with 3 refills  Indications: Lower Backache Yes Bernard Kaiser MD       Past Medical History:   Diagnosis Date    Chronic back pain     Hyperlipidemia     Hypothyroidism     Migraine headache     Multiple gastric ulcers     Osteoarthritis     Right wrist pain        Past Surgical History:   Procedure Laterality Date    CARDIAC CATHETERIZATION Left 2016    Right Radial/MHT/    CATARACT REMOVAL Bilateral 2018    COLONOSCOPY      [place] OFFICE/OUTPT VISIT,PROCEDURE ONLY N/A 2018    COLONOSCOPY performed by Allie Camejo MD at [facility] OFFICE/OUTPT VISIT,PROCEDURE ONLY N/A 2018    ----EXCISION PERIANAL SKIN TAG performed by Emma Bond Glenn George MD at [address] ENDOSCOPY      UPPER GASTROINTESTINAL ENDOSCOPY N/A 1/22/2018    EGD BIOPSY performed by Michelle Chung MD at [facility] OR       Family History   Problem Relation Age of Onset    Lupus Father     Heart Disease Father     Heart Attack Father     High Cholesterol Brother        CareTeam (Including outside providers/suppliers regularly involved in providing care):   Patient Care Team:  Hossein Lopez DO as PCP - [address] [place] [address] as PCP - [facility] Provider    Wt Readings from Last 3 Encounters:   01/07/20 191 lb (86.6 kg)   01/07/19 194 lb (88 kg)   12/05/18 196 lb (88.9 kg)     Vitals:    01/07/20 0846   BP: 122/80   Pulse: 59   SpO2: 97%   Weight: 191 lb (86.6 kg)   Height: 5' 8\" (1.727 m)     Body mass index is 29.04 kg/m². Based upon direct observation of the patient, evaluation of cognition reveals recent and remote memory intact.     General Appearance: alert and oriented to person, place and time, well developed and well- nourished, in no acute distress  Skin: warm and dry, no rash or erythema  Head: normocephalic and atraumatic  Eyes: pupils equal, round, and reactive to light, extraocular eye movements intact, conjunctivae normal  ENT: tympanic membrane, external ear and ear canal normal bilaterally, nose without deformity, nasal mucosa and turbinates normal without polyps  Neck: supple and non-tender without mass, no thyromegaly or thyroid nodules, no cervical lymphadenopathy  Pulmonary/Chest: clear to auscultation bilaterally- no wheezes, rales or rhonchi, normal air movement, no respiratory distress  Cardiovascular: normal rate, regular rhythm, normal S1 and S2, no murmurs, rubs, clicks, or gallops, distal pulses intact, no carotid bruits  Abdomen: soft, non-tender, non-distended, normal bowel sounds, no masses or organomegaly  Extremities: no cyanosis, clubbing or edema  Musculoskeletal: normal range of motion, no joint swelling, deformity or tenderness  Neurologic: reflexes normal and symmetric, no cranial nerve deficit, gait, coordination and speech normal    Patient's complete Health Risk Assessment and screening values have been reviewed and are found in Flowsheets. The following problems were reviewed today and where indicated follow up appointments were made and/or referrals ordered. Positive Risk Factor Screenings with Interventions:     Hearing/Vision:  No exam data present  Hearing/Vision  Do you or your family notice any trouble with your hearing?: (!) Yes  Do you have difficulty driving, watching TV, or doing any of your daily activities because of your eyesight?: (!) Yes  Have you had an eye exam within the past year?: Yes  Hearing/Vision Interventions:  · Hearing concerns:  patient declines any further evaluation/treatment for hearing issues   · At times gets feeling of ear needing to pop. Advised if this is acute he can use afrin or otherwise can use flonase.     Personalized Preventive Plan   Current Health Maintenance Status  Immunization History   Administered Date(s) Administered    Influenza, Quadv, IM, PF (6 mo and older Fluzone, Flulaval, Fluarix, and 3 yrs and older Afluria) 10/18/2018    Influenza, Triv, inactivated, subunit, adjuvanted, IM (Fluad 65 yrs and older) 10/18/2019    Pneumococcal Conjugate 13-valent (Dorisann Peel) 04/29/2016    Pneumococcal Polysaccharide ([id number]) 07/12/2017    Zoster Live (Zostavax) 05/10/2016        Health Maintenance   Topic Date Due    Hepatitis C screen  1950    DTaP/Tdap/Td vaccine (1 - Tdap) 04/21/1962    Shingles Vaccine (2 of 3) 07/05/2016    Annual Wellness Visit (AWV)  05/29/2019    TSH testing  11/29/2019    Lipid screen  02/21/2020    Diabetes screen  11/29/2021    Colon cancer screen colonoscopy  01/22/2023    Flu vaccine  Completed    Pneumococcal 65+ years Vaccine  Completed    AAA screen  Completed     Recommendations for Preventive Services Due: see orders and patient instructions/AVS.  . Recommended screening schedule for the next 5-10 years is provided to the patient in written form: see Patient Instructions/AVS.    There are no diagnoses linked to this encounter. Updating labs. Advised shingrix and consider tdap.

## 2020-01-09 ENCOUNTER — HOSPITAL ENCOUNTER (OUTPATIENT)
Age: 70
Discharge: HOME | End: 2020-01-09
Payer: MEDICARE

## 2020-01-09 LAB
ALBUMIN SERPL-MCNC: 4.3 G/DL (ref 3.5–5.2)
ALBUMIN/GLOBULIN RATIO: ABNORMAL (ref 1–2.5)
ALP BLD-CCNC: 138 U/L (ref 40–129)
ALT SERPL-CCNC: 60 U/L (ref 5–41)
ANION GAP SERPL CALCULATED.3IONS-SCNC: 12 MMOL/L (ref 9–17)
AST SERPL-CCNC: 43 U/L
BILIRUB SERPL-MCNC: 1.17 MG/DL (ref 0.3–1.2)
BUN BLDV-MCNC: 19 MG/DL (ref 8–23)
BUN/CREAT BLD: 15 (ref 9–20)
CALCIUM SERPL-MCNC: 9.8 MG/DL (ref 8.6–10.4)
CHLORIDE BLD-SCNC: 101 MMOL/L (ref 98–107)
CHOLESTEROL/HDL RATIO: 2
CHOLESTEROL: 161 MG/DL
CO2: 26 MMOL/L (ref 20–31)
CREAT SERPL-MCNC: 1.29 MG/DL (ref 0.7–1.2)
GFR AFRICAN AMERICAN: >60 ML/MIN
GFR NON-AFRICAN AMERICAN: 55 ML/MIN
GFR SERPL CREATININE-BSD FRML MDRD: ABNORMAL ML/MIN/{1.73_M2}
GFR SERPL CREATININE-BSD FRML MDRD: ABNORMAL ML/MIN/{1.73_M2}
GLUCOSE BLD-MCNC: 110 MG/DL (ref 70–99)
HDLC SERPL-MCNC: 81 MG/DL
HEPATITIS C ANTIBODY: NONREACTIVE
LDL CHOLESTEROL: 68 MG/DL (ref 0–130)
PATIENT FASTING?: YES
POTASSIUM SERPL-SCNC: 4 MMOL/L (ref 3.7–5.3)
PROSTATE SPECIFIC ANTIGEN: 3.03 UG/L
SODIUM BLD-SCNC: 139 MMOL/L (ref 135–144)
THYROXINE, FREE: 2.27 NG/DL (ref 0.93–1.7)
TOTAL PROTEIN: 7.2 G/DL (ref 6.4–8.3)
TRIGL SERPL-MCNC: 61 MG/DL
TSH SERPL DL<=0.05 MIU/L-ACNC: 0.14 MIU/L (ref 0.3–5)
VLDLC SERPL CALC-MCNC: NORMAL MG/DL (ref 1–30)

## 2020-01-09 PROCEDURE — 36415 COLL VENOUS BLD VENIPUNCTURE: CPT

## 2020-01-09 PROCEDURE — 80061 LIPID PANEL: CPT

## 2020-01-09 PROCEDURE — 86803 HEPATITIS C AB TEST: CPT

## 2020-01-09 PROCEDURE — G0103 PSA SCREENING: HCPCS

## 2020-01-09 PROCEDURE — 84439 ASSAY OF FREE THYROXINE: CPT

## 2020-01-09 PROCEDURE — 80053 COMPREHEN METABOLIC PANEL: CPT

## 2020-01-09 PROCEDURE — 84443 ASSAY THYROID STIM HORMONE: CPT

## 2020-01-10 ENCOUNTER — TELEPHONE (OUTPATIENT)
Dept: FAMILY MEDICINE CLINIC | Age: 70
End: 2020-01-10

## 2020-01-10 RX ORDER — LEVOTHYROXINE SODIUM 0.15 MG/1
150 TABLET ORAL
Qty: 30 TABLET | Refills: 1 | Status: SHIPPED | OUTPATIENT
Start: 2020-01-10 | End: 2020-04-12 | Stop reason: SDUPTHER

## 2020-01-10 NOTE — TELEPHONE ENCOUNTER
----- Message from [employer] sent at 1/10/2020 12:27 PM EST -----  Patient had several lab abnormalities. Synthroid dose is now too high. Change back to 150 mcg daily. PSA still in the normal range but had gone up more than normal from last year. Kidney function and liver numbers numbers are abnormal.  What has he been drinking? Any/much alcohol? Enough water? What I would recommend is that he avoid alcohol and drink more water and have repeat CMP in 2 weeks. Increase the Synthroid dose and repeat TSH with reflex and diagnostic PSA both in 6 weeks.

## 2020-01-27 ENCOUNTER — HOSPITAL ENCOUNTER (OUTPATIENT)
Age: 70
Discharge: HOME | End: 2020-01-27
Payer: MEDICARE

## 2020-01-27 LAB
ALBUMIN SERPL-MCNC: 4.5 G/DL (ref 3.5–5.2)
ALBUMIN/GLOBULIN RATIO: ABNORMAL (ref 1–2.5)
ALP BLD-CCNC: 100 U/L (ref 40–129)
ALT SERPL-CCNC: 37 U/L (ref 5–41)
ANION GAP SERPL CALCULATED.3IONS-SCNC: 12 MMOL/L (ref 9–17)
AST SERPL-CCNC: 26 U/L
BILIRUB SERPL-MCNC: 1.97 MG/DL (ref 0.3–1.2)
BUN BLDV-MCNC: 14 MG/DL (ref 8–23)
BUN/CREAT BLD: 12 (ref 9–20)
CALCIUM SERPL-MCNC: 10 MG/DL (ref 8.6–10.4)
CHLORIDE BLD-SCNC: 102 MMOL/L (ref 98–107)
CO2: 26 MMOL/L (ref 20–31)
CREAT SERPL-MCNC: 1.13 MG/DL (ref 0.7–1.2)
GFR AFRICAN AMERICAN: >60 ML/MIN
GFR NON-AFRICAN AMERICAN: >60 ML/MIN
GFR SERPL CREATININE-BSD FRML MDRD: ABNORMAL ML/MIN/{1.73_M2}
GFR SERPL CREATININE-BSD FRML MDRD: ABNORMAL ML/MIN/{1.73_M2}
GLUCOSE BLD-MCNC: 111 MG/DL (ref 70–99)
POTASSIUM SERPL-SCNC: 4.3 MMOL/L (ref 3.7–5.3)
PROSTATE SPECIFIC ANTIGEN: 2.67 UG/L
SODIUM BLD-SCNC: 140 MMOL/L (ref 135–144)
THYROXINE, FREE: 1.95 NG/DL (ref 0.93–1.7)
TOTAL PROTEIN: 7.3 G/DL (ref 6.4–8.3)
TSH SERPL DL<=0.05 MIU/L-ACNC: 0.1 MIU/L (ref 0.3–5)

## 2020-01-27 PROCEDURE — 36415 COLL VENOUS BLD VENIPUNCTURE: CPT

## 2020-01-27 PROCEDURE — 84439 ASSAY OF FREE THYROXINE: CPT

## 2020-01-27 PROCEDURE — 80053 COMPREHEN METABOLIC PANEL: CPT

## 2020-01-27 PROCEDURE — 84153 ASSAY OF PSA TOTAL: CPT

## 2020-01-27 PROCEDURE — 84443 ASSAY THYROID STIM HORMONE: CPT

## 2020-01-28 ENCOUNTER — TELEPHONE (OUTPATIENT)
Dept: FAMILY MEDICINE CLINIC | Age: 70
End: 2020-01-28

## 2020-02-04 RX ORDER — PANTOPRAZOLE SODIUM 40 MG/1
TABLET, DELAYED RELEASE ORAL
Qty: 180 TABLET | Refills: 1 | Status: SHIPPED | OUTPATIENT
Start: 2020-02-04 | End: 2020-08-25 | Stop reason: SDUPTHER

## 2020-08-25 ENCOUNTER — TELEPHONE (OUTPATIENT)
Dept: FAMILY MEDICINE CLINIC | Age: 70
End: 2020-08-25

## 2020-08-25 RX ORDER — TAMSULOSIN HYDROCHLORIDE 0.4 MG/1
0.4 CAPSULE ORAL NIGHTLY
Qty: 90 CAPSULE | Refills: 1 | Status: SHIPPED | OUTPATIENT
Start: 2020-08-25 | End: 2021-06-28 | Stop reason: SDUPTHER

## 2020-08-25 RX ORDER — PANTOPRAZOLE SODIUM 40 MG/1
TABLET, DELAYED RELEASE ORAL
Qty: 180 TABLET | Refills: 1 | Status: SHIPPED | OUTPATIENT
Start: 2020-08-25 | End: 2021-02-04 | Stop reason: SDUPTHER

## 2020-08-25 RX ORDER — LEVOTHYROXINE SODIUM 0.15 MG/1
150 TABLET ORAL
Qty: 30 TABLET | Refills: 0 | Status: SHIPPED | OUTPATIENT
Start: 2020-08-25 | End: 2020-09-25 | Stop reason: SDUPTHER

## 2020-09-25 ENCOUNTER — TELEPHONE (OUTPATIENT)
Dept: FAMILY MEDICINE CLINIC | Age: 70
End: 2020-09-25

## 2020-09-25 ENCOUNTER — HOSPITAL ENCOUNTER (OUTPATIENT)
Age: 70
Discharge: HOME | End: 2020-09-25
Payer: MEDICARE

## 2020-09-25 LAB — TSH SERPL DL<=0.05 MIU/L-ACNC: 0.55 MIU/L (ref 0.3–5)

## 2020-09-25 PROCEDURE — 36415 COLL VENOUS BLD VENIPUNCTURE: CPT

## 2020-09-25 PROCEDURE — 84443 ASSAY THYROID STIM HORMONE: CPT

## 2020-09-25 RX ORDER — LEVOTHYROXINE SODIUM 0.15 MG/1
150 TABLET ORAL
Qty: 90 TABLET | Refills: 1 | Status: SHIPPED | OUTPATIENT
Start: 2020-09-25 | End: 2021-03-28 | Stop reason: SDUPTHER

## 2020-09-25 NOTE — TELEPHONE ENCOUNTER
----- Message from Carlos Carson DO sent at 9/25/2020 10:12 AM EDT -----  Thyroid okay now, continue current dose

## 2020-09-25 NOTE — TELEPHONE ENCOUNTER
Patient notified of results. He needs a refill for 90 supply sent to [facility]. Last visit:  1/7/2020  Next Visit Date:  No future appointments. Last Med refill:    Medication List:  Prior to Admission medications    Medication Sig Start Date End Date Taking? Authorizing Provider   levothyroxine (SYNTHROID) 150 MCG tablet Take 1 tablet by mouth every morning (before breakfast) 8/25/20  Yes Marci Mangle, DO   tamsulosin (FLOMAX) 0.4 MG capsule Take 1 capsule by mouth nightly 8/25/20   Marci Mangle, DO   pantoprazole (PROTONIX) 40 MG tablet TAKE 1 TABLET BY MOUTH TWICE DAILY 8/25/20   Mamie Hence, APRN - CNP   atorvastatin (LIPITOR) 40 MG tablet Take 1 tablet by mouth daily 4/13/20   Marci Mangle, DO   Nerve Stimulator (EMJOI TENS) JOSI Use as directed.   Include 2 sets of pads, with 3 refills  Indications: Lower Backache 11/28/11   Юлия Rivera MD       Allergies:  Erythromycin and Isosorbide nitrate    No results found for: LABA1C          ( goal A1C is < 7)   No results found for: LABMICR  LDL Cholesterol (mg/dL)   Date Value   01/09/2020 68   11/29/2018 60     LDL Calculated (mg/dL)   Date Value   11/02/2017 64   05/05/2016 158       (goal LDL is <100)   AST (U/L)   Date Value   01/27/2020 26     ALT (U/L)   Date Value   01/27/2020 37     BUN (mg/dL)   Date Value   01/27/2020 14     BP Readings from Last 3 Encounters:   01/07/20 122/80   01/07/19 128/80   12/05/18 120/80          (goal 120/80)

## 2020-11-30 RX ORDER — ATORVASTATIN CALCIUM 40 MG/1
TABLET, FILM COATED ORAL
Qty: 90 TABLET | Refills: 1 | Status: SHIPPED | OUTPATIENT
Start: 2020-11-30 | End: 2021-06-28 | Stop reason: SDUPTHER

## 2020-12-02 ENCOUNTER — OFFICE VISIT (OUTPATIENT)
Dept: FAMILY MEDICINE CLINIC | Age: 70
End: 2020-12-02
Payer: MEDICARE

## 2020-12-02 VITALS
HEART RATE: 60 BPM | WEIGHT: 195 LBS | HEIGHT: 69 IN | TEMPERATURE: 97.6 F | DIASTOLIC BLOOD PRESSURE: 80 MMHG | BODY MASS INDEX: 28.88 KG/M2 | SYSTOLIC BLOOD PRESSURE: 120 MMHG

## 2020-12-02 PROCEDURE — 1036F TOBACCO NON-USER: CPT | Performed by: NURSE PRACTITIONER

## 2020-12-02 PROCEDURE — 1123F ACP DISCUSS/DSCN MKR DOCD: CPT | Performed by: NURSE PRACTITIONER

## 2020-12-02 PROCEDURE — 4040F PNEUMOC VAC/ADMIN/RCVD: CPT | Performed by: NURSE PRACTITIONER

## 2020-12-02 PROCEDURE — 3017F COLORECTAL CA SCREEN DOC REV: CPT | Performed by: NURSE PRACTITIONER

## 2020-12-02 PROCEDURE — G8417 CALC BMI ABV UP PARAM F/U: HCPCS | Performed by: NURSE PRACTITIONER

## 2020-12-02 PROCEDURE — 99213 OFFICE O/P EST LOW 20 MIN: CPT | Performed by: NURSE PRACTITIONER

## 2020-12-02 PROCEDURE — G8484 FLU IMMUNIZE NO ADMIN: HCPCS | Performed by: NURSE PRACTITIONER

## 2020-12-02 PROCEDURE — G8427 DOCREV CUR MEDS BY ELIG CLIN: HCPCS | Performed by: NURSE PRACTITIONER

## 2020-12-02 RX ORDER — FOLIC ACID 0.8 MG
TABLET ORAL
COMMUNITY

## 2020-12-02 ASSESSMENT — ENCOUNTER SYMPTOMS
VOMITING: 0
NAUSEA: 1
SHORTNESS OF BREATH: 0
FLATUS: 1
ABDOMINAL PAIN: 1
BLOOD IN STOOL: 0
BELCHING: 0
CONSTIPATION: 0
COUGH: 0
DIARRHEA: 0

## 2020-12-02 NOTE — PROGRESS NOTES
HPI Notes    Name: Elizabeth Fairchild  : 1950         Chief Complaint:     Chief Complaint   Patient presents with    Abdominal Pain     Complains of \"episodes\" for 1 month. Epigastric pain that radiate upward to mid sternum area. Describes pain as \"a large knot\" and pressure and tightening of abd muscles. Pain was relieved after applied ice to abd.  Nausea       History of Present Illness:        Abdominal Pain   This is a new problem. The current episode started 1 to 4 weeks ago (1 month ago). The onset quality is sudden. The problem occurs intermittently (once a month ago, once a week ago, once 2 days ago). The pain is located in the epigastric region (feels a sensation of pressure pushing up from the stomach up under the ribs). The quality of the pain is aching and cramping. The abdominal pain radiates to the chest. Associated symptoms include flatus (mild) and nausea. Pertinent negatives include no belching, constipation, diarrhea, fever, headaches, melena or vomiting. Associated symptoms comments: No acid reflux  Feels like a knot in him bowels and stomach that slowly moves up into his chest. Nothing aggravates the pain. The pain is relieved by nothing. He has tried proton pump inhibitors for the symptoms.      Last BM this AM.  Past Medical History:     Past Medical History:   Diagnosis Date    Chronic back pain     Hyperlipidemia     Hypothyroidism     Migraine headache     Multiple gastric ulcers     Osteoarthritis     Right wrist pain       Reviewed all health maintenance requirements and ordered appropriate tests  Health Maintenance Due   Topic Date Due    DTaP/Tdap/Td vaccine (1 - Tdap) 1970    Shingles Vaccine (2 of 3) 2016    Flu vaccine (1) 2020       Past Surgical History:     Past Surgical History:   Procedure Laterality Date    CARDIAC CATHETERIZATION Left 2016    Right Radial/MHT/    CATARACT REMOVAL Bilateral 2018    COLONOSCOPY      [place] OFFICE/OUTPT VISIT,PROCEDURE ONLY N/A 1/22/2018    COLONOSCOPY performed by Sharon Tony MD at [address] OFFICE/OUTPT [facility] N/A 1/29/2018    ----EXCISION PERIANAL SKIN TAG performed by Sharon Tony MD at [address] [facility] ENDOSCOPY N/A 1/22/2018    EGD BIOPSY performed by Sharon Tony MD at [facility] OR        Medications:       Prior to Admission medications    Medication Sig Start Date End Date Taking? Authorizing Provider   Magnesium 500 MG CAPS Take by mouth   Yes Historical Provider, MD   atorvastatin (LIPITOR) 40 MG tablet Take 1 tablet by mouth daily 11/30/20  Yes Regenia Abo, DO   levothyroxine (SYNTHROID) 150 MCG tablet Take 1 tablet by mouth every morning (before breakfast) 9/25/20  Yes Regenia Abo, DO   tamsulosin (FLOMAX) 0.4 MG capsule Take 1 capsule by mouth nightly 8/25/20  Yes Regenia Abo, DO   pantoprazole (PROTONIX) 40 MG tablet TAKE 1 TABLET BY MOUTH TWICE DAILY 8/25/20  Yes [place] MARIA DEL ROSARIO Art CNP   Nerve Stimulator (EMJOI TENS) JOSI Use as directed. Include 2 sets of pads, with 3 refills  Indications: Lower Backache 11/28/11  Yes Earlyne Fleischer, MD        Allergies:       Erythromycin and Isosorbide nitrate    Social History:     Tobacco:    reports that he quit smoking about 5 years ago. His smoking use included cigarettes. He has a 20.00 pack-year smoking history. He has quit using smokeless tobacco.  Alcohol:      reports current alcohol use. Drug Use:  reports no history of drug use. Family History:        Family History   Problem Relation Age of Onset    Lupus Father     Heart Disease Father     Heart Attack Father     High Cholesterol Brother        Review of Systems:         Review of Systems   Constitutional: Negative for chills and fever. Respiratory: Negative for cough and shortness of breath. Cardiovascular: Negative for chest pain and palpitations. Gastrointestinal: Positive for abdominal pain, flatus (mild) and nausea. Negative for blood in stool, constipation, diarrhea, melena and vomiting. Neurological: Negative for dizziness, seizures and headaches. Physical Exam:     Vitals:  /80 (Site: Left Upper Arm, Position: Sitting, Cuff Size: Medium Adult)   Pulse 60   Temp 97.6 °F (36.4 °C) (Oral)   Ht 5' 9\" (1.753 m)   Wt 195 lb (88.5 kg)   BMI 28.80 kg/m²       Physical Exam  Vitals signs and nursing note reviewed. Constitutional:       Appearance: He is well-developed. Cardiovascular:      Rate and Rhythm: Normal rate and regular rhythm. Heart sounds: S1 normal and S2 normal.   Pulmonary:      Effort: Pulmonary effort is normal. No respiratory distress. Breath sounds: Normal breath sounds. Abdominal:      General: Bowel sounds are normal.      Palpations: Abdomen is soft. Tenderness: There is abdominal tenderness in the right upper quadrant and epigastric area. Negative signs include Upton's sign, psoas sign and obturator sign. Comments: Neg Merkel test   Skin:     General: Skin is warm and dry. Psychiatric:         Behavior: Behavior is cooperative.                Data:     Lab Results   Component Value Date     01/27/2020    K 4.3 01/27/2020     01/27/2020    CO2 26 01/27/2020    BUN 14 01/27/2020    CREATININE 1.13 01/27/2020    GLUCOSE 111 01/27/2020    GLUCOSE 94 11/09/2011    PROT 7.3 01/27/2020    LABALBU 4.5 01/27/2020    LABALBU 3.9 11/09/2011    BILITOT 1.97 01/27/2020    ALKPHOS 100 01/27/2020    AST 26 01/27/2020    ALT 37 01/27/2020     Lab Results   Component Value Date    WBC 8.6 11/02/2017    RBC 5.23 11/02/2017    RBC 4.91 11/09/2011    HGB 15.0 11/02/2017    HCT 45.4 11/02/2017    MCV 86.8 11/02/2017    MCH 28.7 11/02/2017    MCHC 33.0 11/02/2017    RDW 13.5 11/02/2017     11/02/2017     11/09/2011    MPV NOT REPORTED 10/21/2016     Lab Results   Component Value Date TSH 0.55 09/25/2020     Lab Results   Component Value Date    CHOL 161 01/09/2020    CHOL 155 11/02/2017    HDL 81 01/09/2020    PSA 2.67 01/27/2020          Assessment & Plan        Diagnosis Orders   1. Epigastric pain  US GALLBLADDER RUQ   2. Right upper quadrant pain  US GALLBLADDER RUQ     Symptoms very vague and definitely not acute. BM today so doubt bowel obstruction. Will send for GBUS to rule out. If neg, will do CT abd. Completed Refills   Requested Prescriptions      No prescriptions requested or ordered in this encounter     Return if symptoms worsen or fail to improve. No orders of the defined types were placed in this encounter. Orders Placed This Encounter   Procedures    US GALLBLADDER RUQ     Standing Status:   Future     Standing Expiration Date:   12/2/2021     Order Specific Question:   Reason for exam:     Answer:   right upper quad and epigastric pain         Patient Instructions     SURVEY:    You may be receiving a survey from [employer] regarding your visit today. Please complete the survey to enable us to provide the highest quality of care to you and your family. If you cannot score us a very good on any question, please call the office to discuss how we could have made your experience a very good one. Thank you. Electronically signed by MARIA DEL ROSARIO Phelps CNP on 12/2/2020 at 11:01 AM           Completed Refills      Requested Prescriptions      No prescriptions requested or ordered in this encounter         Janak Daughters received counseling on the following healthy behaviors: medication adherence  Reviewed prior labs and health maintenance. Continue current medications, diet and exercise. Discussed use, benefit, and side effects of prescribed medications. Barriers to medication compliance addressed. Patient given educational materials - see patient instructions. All patient questions answered. Patient voiced understanding.

## 2020-12-02 NOTE — PATIENT INSTRUCTIONS
SURVEY:    You may be receiving a survey from [employer] regarding your visit today. Please complete the survey to enable us to provide the highest quality of care to you and your family. If you cannot score us a very good on any question, please call the office to discuss how we could have made your experience a very good one. Thank you.

## 2020-12-09 ENCOUNTER — HOSPITAL ENCOUNTER (OUTPATIENT)
Dept: ULTRASOUND IMAGING | Age: 70
Discharge: HOME | End: 2020-12-11
Payer: MEDICARE

## 2020-12-09 PROCEDURE — 76705 ECHO EXAM OF ABDOMEN: CPT

## 2021-02-04 RX ORDER — PANTOPRAZOLE SODIUM 40 MG/1
TABLET, DELAYED RELEASE ORAL
Qty: 180 TABLET | Refills: 0 | Status: SHIPPED | OUTPATIENT
Start: 2021-02-04 | End: 2021-05-10 | Stop reason: SDUPTHER

## 2021-03-19 ENCOUNTER — PATIENT MESSAGE (OUTPATIENT)
Dept: FAMILY MEDICINE CLINIC | Age: 71
End: 2021-03-19

## 2021-03-19 NOTE — TELEPHONE ENCOUNTER
From: Augustine Mccormack  To: Gwendolyn Acevedo DO  Sent: 3/19/2021 7:57 AM EDT  Subject: Non-Urgent Medical Question    Would you like to document my Moderna COVID vaccine in my record?   I received on 2/18/21 and 3/18/21

## 2021-03-28 DIAGNOSIS — E03.9 ACQUIRED HYPOTHYROIDISM: ICD-10-CM

## 2021-03-29 RX ORDER — LEVOTHYROXINE SODIUM 0.15 MG/1
150 TABLET ORAL
Qty: 90 TABLET | Refills: 1 | Status: SHIPPED | OUTPATIENT
Start: 2021-03-29 | End: 2021-10-14 | Stop reason: SDUPTHER

## 2021-06-28 RX ORDER — ATORVASTATIN CALCIUM 40 MG/1
TABLET, FILM COATED ORAL
Qty: 90 TABLET | Refills: 1 | Status: SHIPPED | OUTPATIENT
Start: 2021-06-28 | End: 2022-02-24 | Stop reason: SDUPTHER

## 2021-06-28 RX ORDER — TAMSULOSIN HYDROCHLORIDE 0.4 MG/1
0.4 CAPSULE ORAL NIGHTLY
Qty: 90 CAPSULE | Refills: 1 | Status: SHIPPED | OUTPATIENT
Start: 2021-06-28 | End: 2021-10-13

## 2021-06-28 NOTE — TELEPHONE ENCOUNTER
Last visit:  12/2/2020  Next Visit Date:  No future appointments. Medication List:  Prior to Admission medications    Medication Sig Start Date End Date Taking? Authorizing Provider   pantoprazole (PROTONIX) 40 MG tablet TAKE 1 TABLET BY MOUTH TWICE DAILY 5/10/21   Oleta Organ, DO   levothyroxine (SYNTHROID) 150 MCG tablet Take 1 tablet by mouth every morning (before breakfast) 3/29/21   Oleta Organ, DO   Magnesium 500 MG CAPS Take by mouth    Historical Provider, MD   atorvastatin (LIPITOR) 40 MG tablet Take 1 tablet by mouth daily 11/30/20   Oleta Organ, DO   tamsulosin [facility]) 0.4 MG capsule Take 1 capsule by mouth nightly 8/25/20   Oleta Organ, DO   Nerve Stimulator (EMJOI TENS) JOSI Use as directed.   Include 2 sets of pads, with 3 refills  Indications: Lower Backache 11/28/11   Alvin Clements MD

## 2021-10-12 ASSESSMENT — LIFESTYLE VARIABLES
AUDIT-C TOTAL SCORE: 0
HOW OFTEN DURING THE LAST YEAR HAVE YOU HAD A FEELING OF GUILT OR REMORSE AFTER DRINKING: NEVER
HAVE YOU OR SOMEONE ELSE BEEN INJURED AS A RESULT OF YOUR DRINKING: 0
HOW MANY STANDARD DRINKS CONTAINING ALCOHOL DO YOU HAVE ON A TYPICAL DAY: 0
HOW OFTEN DO YOU HAVE SIX OR MORE DRINKS ON ONE OCCASION: NEVER
HOW MANY STANDARD DRINKS CONTAINING ALCOHOL DO YOU HAVE ON A TYPICAL DAY: ONE OR TWO
AUDIT TOTAL SCORE: 0
HOW OFTEN DURING THE LAST YEAR HAVE YOU NEEDED AN ALCOHOLIC DRINK FIRST THING IN THE MORNING TO GET YOURSELF GOING AFTER A NIGHT OF HEAVY DRINKING: NEVER
HAVE YOU OR SOMEONE ELSE BEEN INJURED AS A RESULT OF YOUR DRINKING: NO
HAS A RELATIVE, FRIEND, DOCTOR, OR ANOTHER HEALTH PROFESSIONAL EXPRESSED CONCERN ABOUT YOUR DRINKING OR SUGGESTED YOU CUT DOWN: NO
HOW OFTEN DURING THE LAST YEAR HAVE YOU NEEDED AN ALCOHOLIC DRINK FIRST THING IN THE MORNING TO GET YOURSELF GOING AFTER A NIGHT OF HEAVY DRINKING: 0
HOW OFTEN DURING THE LAST YEAR HAVE YOU FOUND THAT YOU WERE NOT ABLE TO STOP DRINKING ONCE YOU HAD STARTED: 0
HAS A RELATIVE, FRIEND, DOCTOR, OR ANOTHER HEALTH PROFESSIONAL EXPRESSED CONCERN ABOUT YOUR DRINKING OR SUGGESTED YOU CUT DOWN: 0
HOW OFTEN DURING THE LAST YEAR HAVE YOU FAILED TO DO WHAT WAS NORMALLY EXPECTED FROM YOU BECAUSE OF DRINKING: NEVER
HOW OFTEN DO YOU HAVE A DRINK CONTAINING ALCOHOL: 3
HOW OFTEN DO YOU HAVE A DRINK CONTAINING ALCOHOL: TWO TO THREE TIMES A WEEK
AUDIT-C TOTAL SCORE: 3
AUDIT TOTAL SCORE: 3
HOW OFTEN DURING THE LAST YEAR HAVE YOU FOUND THAT YOU WERE NOT ABLE TO STOP DRINKING ONCE YOU HAD STARTED: NEVER
HOW OFTEN DURING THE LAST YEAR HAVE YOU FAILED TO DO WHAT WAS NORMALLY EXPECTED FROM YOU BECAUSE OF DRINKING: 0
HOW OFTEN DURING THE LAST YEAR HAVE YOU BEEN UNABLE TO REMEMBER WHAT HAPPENED THE NIGHT BEFORE BECAUSE YOU HAD BEEN DRINKING: 0
HOW OFTEN DURING THE LAST YEAR HAVE YOU BEEN UNABLE TO REMEMBER WHAT HAPPENED THE NIGHT BEFORE BECAUSE YOU HAD BEEN DRINKING: NEVER
HOW OFTEN DO YOU HAVE SIX OR MORE DRINKS ON ONE OCCASION: 0
HOW OFTEN DURING THE LAST YEAR HAVE YOU HAD A FEELING OF GUILT OR REMORSE AFTER DRINKING: 0

## 2021-10-12 ASSESSMENT — PATIENT HEALTH QUESTIONNAIRE - PHQ9
SUM OF ALL RESPONSES TO PHQ9 QUESTIONS 1 & 2: 0
SUM OF ALL RESPONSES TO PHQ QUESTIONS 1-9: 0
2. FEELING DOWN, DEPRESSED OR HOPELESS: 0
1. LITTLE INTEREST OR PLEASURE IN DOING THINGS: 0

## 2021-10-13 ENCOUNTER — OFFICE VISIT (OUTPATIENT)
Dept: FAMILY MEDICINE CLINIC | Age: 71
End: 2021-10-13
Payer: MEDICARE

## 2021-10-13 ENCOUNTER — HOSPITAL ENCOUNTER (OUTPATIENT)
Age: 71
Discharge: HOME | End: 2021-10-13
Payer: MEDICARE

## 2021-10-13 VITALS
HEIGHT: 69 IN | BODY MASS INDEX: 28.14 KG/M2 | DIASTOLIC BLOOD PRESSURE: 70 MMHG | HEART RATE: 53 BPM | SYSTOLIC BLOOD PRESSURE: 130 MMHG | OXYGEN SATURATION: 98 % | WEIGHT: 190 LBS

## 2021-10-13 DIAGNOSIS — R35.0 FREQUENT URINATION: ICD-10-CM

## 2021-10-13 DIAGNOSIS — Z12.5 PROSTATE CANCER SCREENING: ICD-10-CM

## 2021-10-13 DIAGNOSIS — Z00.00 ROUTINE GENERAL MEDICAL EXAMINATION AT A HEALTH CARE FACILITY: Primary | ICD-10-CM

## 2021-10-13 DIAGNOSIS — E03.9 ACQUIRED HYPOTHYROIDISM: ICD-10-CM

## 2021-10-13 DIAGNOSIS — R10.10 UPPER ABDOMINAL PAIN: ICD-10-CM

## 2021-10-13 DIAGNOSIS — Z23 FLU VACCINE NEED: ICD-10-CM

## 2021-10-13 DIAGNOSIS — E78.00 PURE HYPERCHOLESTEROLEMIA: ICD-10-CM

## 2021-10-13 LAB
ALBUMIN SERPL-MCNC: 4.4 G/DL (ref 3.5–5.2)
ALBUMIN/GLOBULIN RATIO: ABNORMAL (ref 1–2.5)
ALP BLD-CCNC: 97 U/L (ref 40–129)
ALT SERPL-CCNC: 25 U/L (ref 5–41)
ANION GAP SERPL CALCULATED.3IONS-SCNC: 9 MMOL/L (ref 9–17)
AST SERPL-CCNC: 31 U/L
BILIRUB SERPL-MCNC: 1.98 MG/DL (ref 0.3–1.2)
BUN BLDV-MCNC: 15 MG/DL (ref 8–23)
BUN/CREAT BLD: 12 (ref 9–20)
CALCIUM SERPL-MCNC: 9.3 MG/DL (ref 8.6–10.4)
CHLORIDE BLD-SCNC: 103 MMOL/L (ref 98–107)
CHOLESTEROL/HDL RATIO: 2.1
CHOLESTEROL: 156 MG/DL
CO2: 27 MMOL/L (ref 20–31)
CREAT SERPL-MCNC: 1.22 MG/DL (ref 0.7–1.2)
GFR AFRICAN AMERICAN: >60 ML/MIN
GFR NON-AFRICAN AMERICAN: 59 ML/MIN
GFR SERPL CREATININE-BSD FRML MDRD: ABNORMAL ML/MIN/{1.73_M2}
GFR SERPL CREATININE-BSD FRML MDRD: ABNORMAL ML/MIN/{1.73_M2}
GLUCOSE BLD-MCNC: 100 MG/DL (ref 70–99)
HDLC SERPL-MCNC: 75 MG/DL
LDL CHOLESTEROL: 70 MG/DL (ref 0–130)
POTASSIUM SERPL-SCNC: 4.6 MMOL/L (ref 3.7–5.3)
PROSTATE SPECIFIC ANTIGEN: 2.4 UG/L
SODIUM BLD-SCNC: 139 MMOL/L (ref 135–144)
TOTAL PROTEIN: 6.9 G/DL (ref 6.4–8.3)
TRIGL SERPL-MCNC: 56 MG/DL
TSH SERPL DL<=0.05 MIU/L-ACNC: 0.34 MIU/L (ref 0.3–5)
VLDLC SERPL CALC-MCNC: NORMAL MG/DL (ref 1–30)

## 2021-10-13 PROCEDURE — 1036F TOBACCO NON-USER: CPT | Performed by: FAMILY MEDICINE

## 2021-10-13 PROCEDURE — G8427 DOCREV CUR MEDS BY ELIG CLIN: HCPCS | Performed by: FAMILY MEDICINE

## 2021-10-13 PROCEDURE — 80061 LIPID PANEL: CPT

## 2021-10-13 PROCEDURE — 4040F PNEUMOC VAC/ADMIN/RCVD: CPT | Performed by: FAMILY MEDICINE

## 2021-10-13 PROCEDURE — 1123F ACP DISCUSS/DSCN MKR DOCD: CPT | Performed by: FAMILY MEDICINE

## 2021-10-13 PROCEDURE — G0008 ADMIN INFLUENZA VIRUS VAC: HCPCS | Performed by: FAMILY MEDICINE

## 2021-10-13 PROCEDURE — 3017F COLORECTAL CA SCREEN DOC REV: CPT | Performed by: FAMILY MEDICINE

## 2021-10-13 PROCEDURE — 36415 COLL VENOUS BLD VENIPUNCTURE: CPT

## 2021-10-13 PROCEDURE — G0439 PPPS, SUBSEQ VISIT: HCPCS | Performed by: FAMILY MEDICINE

## 2021-10-13 PROCEDURE — G0103 PSA SCREENING: HCPCS

## 2021-10-13 PROCEDURE — 99214 OFFICE O/P EST MOD 30 MIN: CPT | Performed by: FAMILY MEDICINE

## 2021-10-13 PROCEDURE — G8417 CALC BMI ABV UP PARAM F/U: HCPCS | Performed by: FAMILY MEDICINE

## 2021-10-13 PROCEDURE — 90674 CCIIV4 VAC NO PRSV 0.5 ML IM: CPT | Performed by: FAMILY MEDICINE

## 2021-10-13 PROCEDURE — G8482 FLU IMMUNIZE ORDER/ADMIN: HCPCS | Performed by: FAMILY MEDICINE

## 2021-10-13 PROCEDURE — 84443 ASSAY THYROID STIM HORMONE: CPT

## 2021-10-13 PROCEDURE — 80053 COMPREHEN METABOLIC PANEL: CPT

## 2021-10-13 SDOH — ECONOMIC STABILITY: FOOD INSECURITY: WITHIN THE PAST 12 MONTHS, THE FOOD YOU BOUGHT JUST DIDN'T LAST AND YOU DIDN'T HAVE MONEY TO GET MORE.: NEVER TRUE

## 2021-10-13 SDOH — ECONOMIC STABILITY: FOOD INSECURITY: WITHIN THE PAST 12 MONTHS, YOU WORRIED THAT YOUR FOOD WOULD RUN OUT BEFORE YOU GOT MONEY TO BUY MORE.: NEVER TRUE

## 2021-10-13 ASSESSMENT — SOCIAL DETERMINANTS OF HEALTH (SDOH): HOW HARD IS IT FOR YOU TO PAY FOR THE VERY BASICS LIKE FOOD, HOUSING, MEDICAL CARE, AND HEATING?: NOT HARD AT ALL

## 2021-10-13 NOTE — PATIENT INSTRUCTIONS
Personalized Preventive Plan for Ana Cordon - 10/13/2021  Medicare offers a range of preventive health benefits. Some of the tests and screenings are paid in full while other may be subject to a deductible, co-insurance, and/or copay. Some of these benefits include a comprehensive review of your medical history including lifestyle, illnesses that may run in your family, and various assessments and screenings as appropriate. After reviewing your medical record and screening and assessments performed today your provider may have ordered immunizations, labs, imaging, and/or referrals for you. A list of these orders (if applicable) as well as your Preventive Care list are included within your After Visit Summary for your review. Other Preventive Recommendations:    · A preventive eye exam performed by an eye specialist is recommended every 1-2 years to screen for glaucoma; cataracts, macular degeneration, and other eye disorders. · A preventive dental visit is recommended every 6 months. · Try to get at least 150 minutes of exercise per week or 10,000 steps per day on a pedometer . · Order or download the FREE \"Exercise & Physical Activity: Your Everyday Guide\" from [employer]. Call 1-981.585.5382 or search [employer] online. · You need 8714-1799 mg of calcium and 6705-4965 IU of vitamin D per day. It is possible to meet your calcium requirement with diet alone, but a vitamin D supplement is usually necessary to meet this goal.  · When exposed to the sun, use a sunscreen that protects against both UVA and UVB radiation with an SPF of 30 or greater. Reapply every 2 to 3 hours or after sweating, drying off with a towel, or swimming. · Always wear a seat belt when traveling in a car. Always wear a helmet when riding a bicycle or motorcycle.

## 2021-10-13 NOTE — PROGRESS NOTES
Medicare Annual Wellness Visit  Name: Elena Gillette Date: 10/16/2021   MRN: [id number] Sex: Male   Age: 80 y.o. Ethnicity: Non- / Non    : 1950 Race: White (non-)      Steven Metz is here for Medicare AWV and Hypothyroidism    Screenings for behavioral, psychosocial and functional/safety risks, and cognitive dysfunction are all negative except as indicated below. These results, as well as other patient data from the [address] form, are documented in Flowsheets linked to this Encounter. Allergies   Allergen Reactions    Erythromycin Other (See Comments)     Swelling of mouth, mouth sores    Isosorbide Nitrate Other (See Comments)     Migraine headaches         Prior to Visit Medications    Medication Sig Taking? Authorizing Provider   atorvastatin (LIPITOR) 40 MG tablet Take 1 tablet by mouth daily Yes Chu Benz DO   pantoprazole (PROTONIX) 40 MG tablet TAKE 1 TABLET BY MOUTH TWICE DAILY Yes Chu Benz DO   Magnesium 500 MG CAPS Take by mouth Yes Historical Provider, MD   Nerve Stimulator (EMELIJAHI TENS) JOSI Use as directed.   Include 2 sets of pads, with 3 refills  Indications: Lower Backache Yes Lazarus Rubenstein, MD   levothyroxine (SYNTHROID) 150 MCG tablet Take 1 tablet by mouth every morning (before breakfast)  Chu Benz DO         Past Medical History:   Diagnosis Date    Chronic back pain     Hyperlipidemia     Hypothyroidism     Migraine headache     Multiple gastric ulcers     Osteoarthritis     Right wrist pain        Past Surgical History:   Procedure Laterality Date    CARDIAC CATHETERIZATION Left 2016    Right Radial/MHT/    CATARACT REMOVAL Bilateral 2018    COLONOSCOPY      [place] OFFICE/OUTPT VISIT,PROCEDURE ONLY N/A 2018    COLONOSCOPY performed by Harish Agustin MD at [facility] OFFICE/OUTPT VISIT,PROCEDURE ONLY N/A 2018    ----EXCISION PERIANAL SKIN TAG performed by Mela Boothe Gali Livingston MD at [facility] ENDOSCOPY      UPPER GASTROINTESTINAL ENDOSCOPY N/A 1/22/2018    EGD BIOPSY performed by Valeriy Huertas MD at [facility] OR         Family History   Problem Relation Age of Onset    Lupus Father     Heart Disease Father     Heart Attack Father     High Cholesterol Brother        CareTeam (Including outside providers/suppliers regularly involved in providing care):   Patient Care Team:  Janna Fitzgerald DO as PCP - [address] [place] [address],  [facility] PCP [facility]    Wt Readings from Last 3 Encounters:   10/13/21 190 lb (86.2 kg)   12/02/20 195 lb (88.5 kg)   01/07/20 191 lb (86.6 kg)     Vitals:    10/13/21 0959   BP: 130/70   Pulse: 53   SpO2: 98%   Weight: 190 lb (86.2 kg)   Height: 5' 9\" (1.753 m)     Body mass index is 28.06 kg/m². Based upon direct observation of the patient, evaluation of cognition reveals recent and remote memory intact. Patient's complete Health Risk Assessment and screening values have been reviewed and are found in Flowsheets. The following problems were reviewed today and where indicated follow up appointments were made and/or referrals ordered. Positive Risk Factor Screenings with Interventions:            General Health and ACP:  General  In general, how would you say your health is?: Good  In the past 7 days, have you experienced any of the following?  New or Increased Pain, New or Increased Fatigue, Loneliness, Social Isolation, Stress or Anger?: (!) New or Increased Pain  Do you get the social and emotional support that you need?: Yes  Do you have a Living Will?: Yes  Advance Directives     Power of  Living Will ACP-Advance Directive ACP-Power of     Not on File [place] on 11/20/17 Filed [address] [facility] Risk Interventions:  · Pain issues: R wrist - may see ortho soon    Health Habits/Nutrition:  Health Habits/Nutrition  Do you exercise for at least 20 minutes 2-3 times per week?: Yes  Have you lost any weight without trying in the past 3 months?: No  Do you eat only one meal per day?: (!) Yes  Have you seen the dentist within the past year?: Yes  Body mass index: (!) 28.06  Health Habits/Nutrition Interventions:  · Nutritional issues:  also eats a little something for breakfast and then just one big meal per day    Hearing/Vision:  No exam data present  Hearing/Vision  Do you or your family notice any trouble with your hearing that hasn't been managed with hearing aids?: (!) Yes  Do you have difficulty driving, watching TV, or doing any of your daily activities because of your eyesight?: No  Have you had an eye exam within the past year?: (!) No  Hearing/Vision Interventions:  · Hearing concerns:  patient declines any further evaluation/treatment for hearing issues    Safety:  Safety  Do you have working smoke detectors?: Yes  Have all throw rugs been removed or fastened?: (!) No  Do you have non-slip mats or surfaces in all bathtubs/showers?: Yes  Do all of your stairways have a railing or banister?: Yes  Are your doorways, halls and stairs free of clutter?: Yes  Do you always fasten your seatbelt when you are in a car?: Yes  Safety Interventions:  · Home safety tips provided     Personalized Preventive Plan   Current Health Maintenance Status  Immunization History   Administered Date(s) Administered    COVID-19, Moderna, PF, 100mcg/0.5mL 02/18/2021, 03/18/2021    Influenza, MDCK Quadv, IM, PF (Flucelvax 2 yrs and older) 10/13/2021    Influenza, Quadv, IM, PF (6 mo and older Fluzone, Flulaval, Fluarix, and 3 yrs and older Afluria) 10/18/2018    Influenza, Triv, inactivated, subunit, adjuvanted, IM (Fluad 65 yrs and older) 10/18/2019    Pneumococcal Conjugate 13-valent (Kulwinder Rouleau) 04/29/2016    Pneumococcal Polysaccharide ([id number]) 07/12/2017    Zoster Live (Zostavax) 05/10/2016        Health Maintenance   Topic Date Due    DTaP/Tdap/Td vaccine (1 - Tdap) Never done    Shingles Vaccine (2 of 3) 07/05/2016    Annual Wellness Visit (AWV)  01/07/2021    Lipid screen  10/13/2022    TSH testing  10/13/2022    Colon cancer screen colonoscopy  01/22/2023    Flu vaccine  Completed    Pneumococcal 65+ years Vaccine  Completed    COVID-19 Vaccine  Completed    AAA screen  Completed    Hepatitis C screen  Completed    Hepatitis A vaccine  Aged Out    Hepatitis B vaccine  Aged Out    Hib vaccine  Aged Out    Meningococcal (ACWY) vaccine  Aged Out     Recommendations for INFOGRAPHIQS Due: see orders and patient instructions/AVS.  . Recommended screening schedule for the next 5-10 years is provided to the patient in written form: see Patient Alverto Vance was seen today for medicare awv and hypothyroidism. Diagnoses and all orders for this visit:    Routine general medical examination at a health care facility    Prostate cancer screening  -     PSA screening; Future    Flu vaccine need  -     INFLUENZA, MDCK QUADV, 2 YRS AND OLDER, IM, PF, PREFILL SYR OR SDV, 0.5ML (FLUCELVAX QUADV, PF)    Acquired hypothyroidism  -     TSH with Reflex; Future    Pure hypercholesterolemia  -     Lipid Panel; Future  -     Comprehensive Metabolic Panel;  Future

## 2021-10-13 NOTE — PROGRESS NOTES
Name: Jori Salomon  : 1950         Chief Complaint:     Chief Complaint   Patient presents with    Medicare AWV    Hypothyroidism       History of Present Illness:      Jori Salomon is a 80 y.o.  male who presents with Medicare AWV and Hypothyroidism      HPI    Urine urgency which hasn't responded to flomax despite taking it for a couple yrs. Stopped & started med a couple times and never found relief. No leaking, just has to go urgently. Drinks iced tea, water. Non-alcoholic beer most of the time, maybe 2 regular beers per month. Coffee in the morning. Recently he did start more water than iced tea in the evening, just about a wk ago, and so far that's already helping. Upper abd pain resolved and hasn't returned since he made dietary modification, also restarted fish oil which he had stopped. Prior to that, had been having some pretty frequent and rough attacks, usually at night. GERD has been controlled on protonix. Hypothyroid controlled. Medical History:     Patient Active Problem List   Diagnosis    Hypothyroidism    Pure hypercholesterolemia    Arthritis    Tendinitis of right wrist    PUD (peptic ulcer disease)    Skin tag of perianal region    Scalp psoriasis       Medications:       Prior to Admission medications    Medication Sig Start Date End Date Taking? Authorizing Provider   atorvastatin (LIPITOR) 40 MG tablet Take 1 tablet by mouth daily 21  Yes Quentin Lemme, DO   pantoprazole (PROTONIX) 40 MG tablet TAKE 1 TABLET BY MOUTH TWICE DAILY 5/10/21  Yes Quentin Lorenzme DO   Magnesium 500 MG CAPS Take by mouth   Yes Historical Provider, MD   Nerve Stimulator (EMJOI TENS) JOSI Use as directed.   Include 2 sets of pads, with 3 refills  Indications: Lower Backache 11  Yes Jacques Tineo MD   levothyroxine (SYNTHROID) 150 MCG tablet Take 1 tablet by mouth every morning (before breakfast) 10/14/21   Quentin Lorenzme DO        Allergies:       Erythromycin and Isosorbide nitrate    Physical Exam:     Vitals:  /70   Pulse 53   Ht 5' 9\" (1.753 m)   Wt 190 lb (86.2 kg)   SpO2 98%   BMI 28.06 kg/m²   Physical Exam  Vitals and nursing note reviewed. Constitutional:       Appearance: He is well-developed. HENT:      Right Ear: Hearing and tympanic membrane normal.      Left Ear: Hearing and tympanic membrane normal.      Nose: Nose normal.      Mouth/Throat:      Dentition: Normal dentition. Eyes:      Conjunctiva/sclera: Conjunctivae normal.      Pupils: Pupils are equal, round, and reactive to light. Neck:      Thyroid: No thyroid mass or thyromegaly. Cardiovascular:      Rate and Rhythm: Normal rate and regular rhythm. Heart sounds: S1 normal and S2 normal. No murmur heard. Comments: No peripheral edema. Pulmonary:      Effort: Pulmonary effort is normal.      Breath sounds: Normal breath sounds. Abdominal:      General: Bowel sounds are normal.      Palpations: Abdomen is soft. Tenderness: There is no abdominal tenderness. Musculoskeletal:      Comments: Muscles of normal tone and bulk. Normal gait. Lymphadenopathy:      Cervical: No cervical adenopathy. Skin:     General: Skin is warm and dry. Findings: No rash. Neurological:      Mental Status: He is alert and oriented to person, place, and time. Psychiatric:         Behavior: Behavior normal. Behavior is cooperative.          Data:     Lab Results   Component Value Date     10/13/2021    K 4.6 10/13/2021     10/13/2021    CO2 27 10/13/2021    BUN 15 10/13/2021    CREATININE 1.22 10/13/2021    GLUCOSE 100 10/13/2021    GLUCOSE 94 11/09/2011    PROT 6.9 10/13/2021    LABALBU 4.4 10/13/2021    LABALBU 3.9 11/09/2011    BILITOT 1.98 10/13/2021    ALKPHOS 97 10/13/2021    AST 31 10/13/2021    ALT 25 10/13/2021     Lab Results   Component Value Date    WBC 8.6 11/02/2017    RBC 5.23 11/02/2017    RBC 4.91 11/09/2011    HGB 15.0 11/02/2017    HCT 45.4 11/02/2017    MCV 86.8 11/02/2017    MCH 28.7 11/02/2017    MCHC 33.0 11/02/2017    RDW 13.5 11/02/2017     11/02/2017     11/09/2011    MPV NOT REPORTED 10/21/2016     Lab Results   Component Value Date    TSH 0.34 10/13/2021     Lab Results   Component Value Date    CHOL 156 10/13/2021    CHOL 155 11/02/2017    HDL 75 10/13/2021    PSA 2.40 10/13/2021         Assessment & Plan:        Diagnosis Orders   1. Routine general medical examination at a health care facility     2. Prostate cancer screening  PSA screening   3. Flu vaccine need  INFLUENZA, MDCK QUADV, 2 YRS AND OLDER, IM, PF, PREFILL SYR OR SDV, 0.5ML (FLUCELVAX QUADV, PF)   4. Acquired hypothyroidism  TSH with Reflex   5. Pure hypercholesterolemia  Lipid Panel    Comprehensive Metabolic Panel   6. Frequent urination     7. Upper abdominal pain     4. Hypothyroid which has been controlled, updating lab  5. HLD - updating lab and likely cont same lipitor dosing  6. Urine frequency which didn't respond to flomax. Discussed trial of ditropan but pt will work more on decreasing intake of bladder irritants instead. 7. Upper abd pain, known cholelithiasis, better with lower fat diet. Also has GERD and h/o PUD, has remained on protonix. Requested Prescriptions      No prescriptions requested or ordered in this encounter         Patient Instructions     Personalized Preventive Plan for Lois Ferrell - 10/13/2021  Medicare offers a range of preventive health benefits. Some of the tests and screenings are paid in full while other may be subject to a deductible, co-insurance, and/or copay. Some of these benefits include a comprehensive review of your medical history including lifestyle, illnesses that may run in your family, and various assessments and screenings as appropriate.     After reviewing your medical record and screening and assessments performed today your provider may have ordered immunizations, labs, imaging, and/or referrals for you. A list of these orders (if applicable) as well as your Preventive Care list are included within your After Visit Summary for your review. Other Preventive Recommendations:    · A preventive eye exam performed by an eye specialist is recommended every 1-2 years to screen for glaucoma; cataracts, macular degeneration, and other eye disorders. · A preventive dental visit is recommended every 6 months. · Try to get at least 150 minutes of exercise per week or 10,000 steps per day on a pedometer . · Order or download the FREE \"Exercise & Physical Activity: Your Everyday Guide\" from [employer]. Call 4-311.851.2694 or search [employer] online. · You need 6343-3118 mg of calcium and 7614-7531 IU of vitamin D per day. It is possible to meet your calcium requirement with diet alone, but a vitamin D supplement is usually necessary to meet this goal.  · When exposed to the sun, use a sunscreen that protects against both UVA and UVB radiation with an SPF of 30 or greater. Reapply every 2 to 3 hours or after sweating, drying off with a towel, or swimming. · Always wear a seat belt when traveling in a car. Always wear a helmet when riding a bicycle or motorcycle. Kiya Benitez received counseling on the following healthy behaviors: medication adherence  Reviewed prior labs and health maintenance. Continue current medications, diet and exercise. Discussed use, benefit, and side effects of prescribed medications. Barriers to medication compliance addressed. Patient given educational materials - see patient instructions. All patient questions answered.   Patient voiced understanding.     signed by Guerline White DO on 10/16/2021 at 11:51 PM  [address]  Dept: [phone]

## 2021-10-14 DIAGNOSIS — E03.9 ACQUIRED HYPOTHYROIDISM: ICD-10-CM

## 2021-10-14 RX ORDER — LEVOTHYROXINE SODIUM 0.15 MG/1
150 TABLET ORAL
Qty: 90 TABLET | Refills: 1 | Status: SHIPPED | OUTPATIENT
Start: 2021-10-14 | End: 2022-04-22 | Stop reason: SDUPTHER

## 2021-10-14 NOTE — TELEPHONE ENCOUNTER
Last visit:  10/13/2021  Next Visit Date:  No future appointments. Medication List:  Prior to Admission medications    Medication Sig Start Date End Date Taking? Authorizing Provider   atorvastatin (LIPITOR) 40 MG tablet Take 1 tablet by mouth daily 6/28/21   Vito Reinoso, DO   pantoprazole (PROTONIX) 40 MG tablet TAKE 1 TABLET BY MOUTH TWICE DAILY 5/10/21   Vito Reinoso, DO   levothyroxine (SYNTHROID) 150 MCG tablet Take 1 tablet by mouth every morning (before breakfast) 3/29/21   Vito Reinoso, DO   Magnesium 500 MG CAPS Take by mouth    Historical Provider, MD   Nerve Stimulator (EMJOI TENS) JOSI Use as directed.   Include 2 sets of pads, with 3 refills  Indications: Lower Backache 11/28/11   Philippe De La Rosa MD

## 2021-12-01 ENCOUNTER — HOSPITAL ENCOUNTER (OUTPATIENT)
Age: 71
Discharge: HOME | End: 2021-12-03
Payer: MEDICARE

## 2021-12-01 ENCOUNTER — HOSPITAL ENCOUNTER (OUTPATIENT)
Dept: GENERAL RADIOLOGY | Age: 71
Discharge: HOME | End: 2021-12-03
Payer: MEDICARE

## 2021-12-01 DIAGNOSIS — M19.031 OSTEOARTHRITIS OF RIGHT WRIST, UNSPECIFIED OSTEOARTHRITIS TYPE: ICD-10-CM

## 2021-12-01 PROCEDURE — 73110 X-RAY EXAM OF WRIST: CPT

## 2021-12-17 RX ORDER — PANTOPRAZOLE SODIUM 40 MG/1
TABLET, DELAYED RELEASE ORAL
Qty: 180 TABLET | Refills: 1 | Status: SHIPPED | OUTPATIENT
Start: 2021-12-17 | End: 2022-07-24 | Stop reason: SDUPTHER

## 2022-01-25 ENCOUNTER — APPOINTMENT (OUTPATIENT)
Dept: GENERAL RADIOLOGY | Age: 72
End: 2022-01-25
Payer: MEDICARE

## 2022-01-25 ENCOUNTER — HOSPITAL ENCOUNTER (EMERGENCY)
Age: 72
Discharge: HOME | End: 2022-01-25
Attending: EMERGENCY MEDICINE
Payer: MEDICARE

## 2022-01-25 VITALS
WEIGHT: 194 LBS | OXYGEN SATURATION: 98 % | HEART RATE: 52 BPM | TEMPERATURE: 97.9 F | SYSTOLIC BLOOD PRESSURE: 142 MMHG | RESPIRATION RATE: 18 BRPM | DIASTOLIC BLOOD PRESSURE: 82 MMHG | BODY MASS INDEX: 28.73 KG/M2 | HEIGHT: 69 IN

## 2022-01-25 DIAGNOSIS — S49.92XA INJURY OF LEFT UPPER EXTREMITY, INITIAL ENCOUNTER: Primary | ICD-10-CM
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
## 2022-01-25 PROCEDURE — 73060 X-RAY EXAM OF HUMERUS: CPT

## 2022-01-25 PROCEDURE — 73030 X-RAY EXAM OF SHOULDER: CPT

## 2022-01-25 PROCEDURE — 99284 EMERGENCY DEPT VISIT MOD MDM: CPT

## 2022-01-25 ASSESSMENT — PAIN DESCRIPTION - FREQUENCY: FREQUENCY: CONTINUOUS

## 2022-01-25 ASSESSMENT — PAIN DESCRIPTION - ORIENTATION: ORIENTATION: LEFT

## 2022-01-25 ASSESSMENT — PAIN DESCRIPTION - DESCRIPTORS: DESCRIPTORS: CONSTANT;CRUSHING

## 2022-01-25 ASSESSMENT — PAIN SCALES - GENERAL: PAINLEVEL_OUTOF10: 9

## 2022-01-25 ASSESSMENT — PAIN DESCRIPTION - LOCATION: LOCATION: SHOULDER

## 2022-01-25 ASSESSMENT — PAIN DESCRIPTION - PAIN TYPE: TYPE: ACUTE PAIN

## 2022-01-25 NOTE — ED PROVIDER NOTES
eMERGENCY dEPARTMENT eNCOUnter        [address] [facility]    Chief Complaint   Patient presents with    Shoulder Pain     missed a step and fell on left shoulder onto the tile floor        [facility]    Raman Gonzalez is a 80 y.o. male who presents to ED from home. By car. With complaint of L arm pain. Onset PTA. Intensity of symptoms moderate. Location of symptoms L arm, L shoulder. Patient states that he missed a step and fell on a tile floor. Patient presents with pain in the left shoulder. No other injuries. REVIEW OF SYSTEMS    All systems reviewed and positives are in the HPI      PAST MEDICAL HISTORY    Past Medical History:   Diagnosis Date    Chronic back pain     Hyperlipidemia     Hypothyroidism     Migraine headache     Multiple gastric ulcers     Osteoarthritis     Right wrist pain        SURGICAL HISTORY    Past Surgical History:   Procedure Laterality Date    CARDIAC CATHETERIZATION Left 08/25/2016    Right Radial/MHT/    CATARACT REMOVAL Bilateral 04/2018    COLONOSCOPY      MI OFFICE/OUTPT VISIT,PROCEDURE ONLY N/A 1/22/2018    COLONOSCOPY performed by Emily Montano MD at [address] [facility] OFFICE/OUTPT VISIT,PROCEDURE ONLY N/A 1/29/2018    ----EXCISION PERIANAL SKIN TAG performed by Emily Montano MD at [address] [place] [address] ENDOSCOPY N/A 1/22/2018    EGD BIOPSY performed by Emily Montano MD at [place] [address]    Current Outpatient Rx   Medication Sig Dispense Refill    pantoprazole (PROTONIX) 40 MG tablet TAKE 1 TABLET BY MOUTH TWICE DAILY 180 tablet 1    levothyroxine (SYNTHROID) 150 MCG tablet Take 1 tablet by mouth every morning (before breakfast) 90 tablet 1    atorvastatin (LIPITOR) 40 MG tablet Take 1 tablet by mouth daily 90 tablet 1    Magnesium 500 MG CAPS Take by mouth      Nerve Stimulator (EMJOI TENS) JOSI Use as directed.   Include 2 sets of pads, with 3 refills  Indications: Lower Follow-up:  No follow-up provider specified. Final Impression:   1. Injury of left upper extremity, initial encounter    2.  Fall, initial encounter               (Please note that portions of this note were completed with a voice recognition program.  Efforts were made to edit the dictations but occasionally words are mis-transcribed.)         Micheal Simmons MD  01/25/22 [id number]

## 2022-02-24 RX ORDER — ATORVASTATIN CALCIUM 40 MG/1
TABLET, FILM COATED ORAL
Qty: 90 TABLET | Refills: 1 | Status: SHIPPED | OUTPATIENT
Start: 2022-02-24 | End: 2022-10-11 | Stop reason: SDUPTHER

## 2022-04-22 DIAGNOSIS — E03.9 ACQUIRED HYPOTHYROIDISM: ICD-10-CM

## 2022-04-22 RX ORDER — LEVOTHYROXINE SODIUM 0.15 MG/1
150 TABLET ORAL
Qty: 90 TABLET | Refills: 1 | Status: SHIPPED | OUTPATIENT
Start: 2022-04-22 | End: 2022-10-11 | Stop reason: SDUPTHER

## 2022-04-22 NOTE — TELEPHONE ENCOUNTER
Last OV: 10/13/2021  Last RX:    Next scheduled apt: Visit date not found          Pt requesting a refill

## 2022-07-25 RX ORDER — PANTOPRAZOLE SODIUM 40 MG/1
TABLET, DELAYED RELEASE ORAL
Qty: 180 TABLET | Refills: 1 | Status: SHIPPED | OUTPATIENT
Start: 2022-07-25

## 2022-10-11 ENCOUNTER — TELEPHONE (OUTPATIENT)
Dept: FAMILY MEDICINE CLINIC | Age: 72
End: 2022-10-11

## 2022-10-11 DIAGNOSIS — E78.00 PURE HYPERCHOLESTEROLEMIA: ICD-10-CM

## 2022-10-11 DIAGNOSIS — Z12.5 PROSTATE CANCER SCREENING: Primary | ICD-10-CM

## 2022-10-11 DIAGNOSIS — E03.9 ACQUIRED HYPOTHYROIDISM: ICD-10-CM

## 2022-10-11 RX ORDER — ATORVASTATIN CALCIUM 40 MG/1
TABLET, FILM COATED ORAL
Qty: 90 TABLET | Refills: 1 | Status: SHIPPED | OUTPATIENT
Start: 2022-10-11

## 2022-10-11 RX ORDER — LEVOTHYROXINE SODIUM 0.15 MG/1
150 TABLET ORAL
Qty: 90 TABLET | Refills: 1 | Status: SHIPPED | OUTPATIENT
Start: 2022-10-11

## 2022-10-11 NOTE — TELEPHONE ENCOUNTER
Last visit:  10/13/2021  Next Visit Date:  No future appointments. Medication List:  Prior to Admission medications    Medication Sig Start Date End Date Taking? Authorizing Provider   pantoprazole (PROTONIX) 40 MG tablet TAKE 1 TABLET BY MOUTH TWICE DAILY 7/25/22   Ramo Harris DO   levothyroxine (SYNTHROID) 150 MCG tablet Take 1 tablet by mouth every morning (before breakfast) 4/22/22   Ramo Harris,    atorvastatin (LIPITOR) 40 MG tablet Take 1 tablet by mouth daily 2/24/22   Ramo Harris, DO   Magnesium 500 MG CAPS Take by mouth    Historical Provider, MD   Nerve Stimulator (EMJOI TENS) JOSI Use as directed.   Include 2 sets of pads, with 3 refills  Indications: Lower Backache 11/28/11   Ирина Coles MD

## 2022-10-11 NOTE — TELEPHONE ENCOUNTER
Please order yearly labs for upcoming appt      Last visit:  10/13/2021  Next Visit Date:    Future Appointments   Date Time Provider Seema Underwood   10/18/2022 10:00 AM DO Khushbu [facility]         Medication List:  Prior to Admission medications    Medication Sig Start Date End Date Taking? Authorizing Provider   pantoprazole (PROTONIX) 40 MG tablet TAKE 1 TABLET BY MOUTH TWICE DAILY 7/25/22   Veronica Monroy,    levothyroxine (SYNTHROID) 150 MCG tablet Take 1 tablet by mouth every morning (before breakfast) 4/22/22   Veronica Monroy,    atorvastatin (LIPITOR) 40 MG tablet Take 1 tablet by mouth daily 2/24/22   Veronica Monroy, DO   Magnesium 500 MG CAPS Take by mouth    Historical Provider, MD   Nerve Stimulator (EMJOI TENS) JOSI Use as directed.   Include 2 sets of pads, with 3 refills  Indications: Lower Backache 11/28/11   Tyler Saint, MD

## 2022-10-14 ENCOUNTER — HOSPITAL ENCOUNTER (OUTPATIENT)
Age: 72
Discharge: HOME | End: 2022-10-14
Payer: MEDICARE

## 2022-10-14 DIAGNOSIS — E78.00 PURE HYPERCHOLESTEROLEMIA: ICD-10-CM

## 2022-10-14 DIAGNOSIS — E03.9 ACQUIRED HYPOTHYROIDISM: ICD-10-CM

## 2022-10-14 DIAGNOSIS — Z12.5 PROSTATE CANCER SCREENING: ICD-10-CM

## 2022-10-14 LAB
ALBUMIN SERPL-MCNC: 4 G/DL (ref 3.5–5.2)
ALP BLD-CCNC: 93 U/L (ref 40–129)
ALT SERPL-CCNC: 34 U/L (ref 5–41)
ANION GAP SERPL CALCULATED.3IONS-SCNC: 8 MMOL/L (ref 9–17)
AST SERPL-CCNC: 30 U/L
BILIRUB SERPL-MCNC: 1.3 MG/DL (ref 0.3–1.2)
BUN BLDV-MCNC: 15 MG/DL (ref 8–23)
BUN/CREAT BLD: 14 (ref 9–20)
CALCIUM SERPL-MCNC: 9.1 MG/DL (ref 8.6–10.4)
CHLORIDE BLD-SCNC: 103 MMOL/L (ref 98–107)
CHOLESTEROL/HDL RATIO: 2.1
CHOLESTEROL: 146 MG/DL
CO2: 27 MMOL/L (ref 20–31)
CREAT SERPL-MCNC: 1.04 MG/DL (ref 0.7–1.2)
GFR SERPL CREATININE-BSD FRML MDRD: >60 ML/MIN/1.73M2
GLUCOSE BLD-MCNC: 104 MG/DL (ref 70–99)
HDLC SERPL-MCNC: 69 MG/DL
LDL CHOLESTEROL: 64 MG/DL (ref 0–130)
PATIENT FASTING?: YES
POTASSIUM SERPL-SCNC: 4.4 MMOL/L (ref 3.7–5.3)
PROSTATE SPECIFIC ANTIGEN: 1.94 NG/ML
SODIUM BLD-SCNC: 138 MMOL/L (ref 135–144)
TOTAL PROTEIN: 6.4 G/DL (ref 6.4–8.3)
TRIGL SERPL-MCNC: 67 MG/DL
TSH SERPL DL<=0.05 MIU/L-ACNC: 0.56 UIU/ML (ref 0.3–5)

## 2022-10-14 PROCEDURE — 80061 LIPID PANEL: CPT

## 2022-10-14 PROCEDURE — 36415 COLL VENOUS BLD VENIPUNCTURE: CPT

## 2022-10-14 PROCEDURE — 80053 COMPREHEN METABOLIC PANEL: CPT

## 2022-10-14 PROCEDURE — 84443 ASSAY THYROID STIM HORMONE: CPT

## 2022-10-14 PROCEDURE — G0103 PSA SCREENING: HCPCS

## 2022-10-18 ENCOUNTER — OFFICE VISIT (OUTPATIENT)
Dept: FAMILY MEDICINE CLINIC | Age: 72
End: 2022-10-18
Payer: MEDICARE

## 2022-10-18 VITALS
OXYGEN SATURATION: 99 % | HEIGHT: 69 IN | WEIGHT: 197 LBS | BODY MASS INDEX: 29.18 KG/M2 | SYSTOLIC BLOOD PRESSURE: 130 MMHG | DIASTOLIC BLOOD PRESSURE: 80 MMHG | HEART RATE: 54 BPM

## 2022-10-18 DIAGNOSIS — Z00.00 MEDICARE ANNUAL WELLNESS VISIT, SUBSEQUENT: Primary | ICD-10-CM

## 2022-10-18 DIAGNOSIS — M25.512 CHRONIC LEFT SHOULDER PAIN: ICD-10-CM

## 2022-10-18 DIAGNOSIS — E03.9 ACQUIRED HYPOTHYROIDISM: ICD-10-CM

## 2022-10-18 DIAGNOSIS — N39.41 URGE INCONTINENCE OF URINE: ICD-10-CM

## 2022-10-18 DIAGNOSIS — R29.898 SHOULDER WEAKNESS: ICD-10-CM

## 2022-10-18 DIAGNOSIS — R35.1 NOCTURIA: ICD-10-CM

## 2022-10-18 DIAGNOSIS — S46.012D TRAUMATIC TEAR OF LEFT ROTATOR CUFF, UNSPECIFIED TEAR EXTENT, SUBSEQUENT ENCOUNTER: ICD-10-CM

## 2022-10-18 DIAGNOSIS — G89.29 CHRONIC LEFT SHOULDER PAIN: ICD-10-CM

## 2022-10-18 DIAGNOSIS — Z23 NEED FOR INFLUENZA VACCINATION: ICD-10-CM

## 2022-10-18 DIAGNOSIS — E78.00 PURE HYPERCHOLESTEROLEMIA: ICD-10-CM

## 2022-10-18 PROCEDURE — 3017F COLORECTAL CA SCREEN DOC REV: CPT | Performed by: FAMILY MEDICINE

## 2022-10-18 PROCEDURE — G8417 CALC BMI ABV UP PARAM F/U: HCPCS | Performed by: FAMILY MEDICINE

## 2022-10-18 PROCEDURE — 1123F ACP DISCUSS/DSCN MKR DOCD: CPT | Performed by: FAMILY MEDICINE

## 2022-10-18 PROCEDURE — G0439 PPPS, SUBSEQ VISIT: HCPCS | Performed by: FAMILY MEDICINE

## 2022-10-18 PROCEDURE — G8484 FLU IMMUNIZE NO ADMIN: HCPCS | Performed by: FAMILY MEDICINE

## 2022-10-18 PROCEDURE — 90694 VACC AIIV4 NO PRSRV 0.5ML IM: CPT | Performed by: FAMILY MEDICINE

## 2022-10-18 PROCEDURE — 99214 OFFICE O/P EST MOD 30 MIN: CPT | Performed by: FAMILY MEDICINE

## 2022-10-18 PROCEDURE — G8427 DOCREV CUR MEDS BY ELIG CLIN: HCPCS | Performed by: FAMILY MEDICINE

## 2022-10-18 PROCEDURE — G0008 ADMIN INFLUENZA VIRUS VAC: HCPCS | Performed by: FAMILY MEDICINE

## 2022-10-18 PROCEDURE — 1036F TOBACCO NON-USER: CPT | Performed by: FAMILY MEDICINE

## 2022-10-18 RX ORDER — SUCRALFATE 1 G/1
TABLET ORAL
COMMUNITY
End: 2022-10-18

## 2022-10-18 RX ORDER — CHLORAL HYDRATE 500 MG
CAPSULE ORAL
COMMUNITY

## 2022-10-18 SDOH — HEALTH STABILITY: PHYSICAL HEALTH: ON AVERAGE, HOW MANY DAYS PER WEEK DO YOU ENGAGE IN MODERATE TO STRENUOUS EXERCISE (LIKE A BRISK WALK)?: 5 DAYS

## 2022-10-18 SDOH — HEALTH STABILITY: PHYSICAL HEALTH: ON AVERAGE, HOW MANY MINUTES DO YOU ENGAGE IN EXERCISE AT THIS LEVEL?: 60 MIN

## 2022-10-18 SDOH — ECONOMIC STABILITY: FOOD INSECURITY: WITHIN THE PAST 12 MONTHS, THE FOOD YOU BOUGHT JUST DIDN'T LAST AND YOU DIDN'T HAVE MONEY TO GET MORE.: NEVER TRUE

## 2022-10-18 SDOH — ECONOMIC STABILITY: FOOD INSECURITY: WITHIN THE PAST 12 MONTHS, YOU WORRIED THAT YOUR FOOD WOULD RUN OUT BEFORE YOU GOT MONEY TO BUY MORE.: NEVER TRUE

## 2022-10-18 ASSESSMENT — LIFESTYLE VARIABLES
HOW OFTEN DO YOU HAVE A DRINK CONTAINING ALCOHOL: 2
HOW MANY STANDARD DRINKS CONTAINING ALCOHOL DO YOU HAVE ON A TYPICAL DAY: 1 OR 2
HOW OFTEN DO YOU HAVE SIX OR MORE DRINKS ON ONE OCCASION: 1
HOW OFTEN DO YOU HAVE A DRINK CONTAINING ALCOHOL: MONTHLY OR LESS
HOW MANY STANDARD DRINKS CONTAINING ALCOHOL DO YOU HAVE ON A TYPICAL DAY: 1

## 2022-10-18 ASSESSMENT — PATIENT HEALTH QUESTIONNAIRE - PHQ9
SUM OF ALL RESPONSES TO PHQ QUESTIONS 1-9: 0
SUM OF ALL RESPONSES TO PHQ9 QUESTIONS 1 & 2: 0
SUM OF ALL RESPONSES TO PHQ QUESTIONS 1-9: 0
SUM OF ALL RESPONSES TO PHQ QUESTIONS 1-9: 0
1. LITTLE INTEREST OR PLEASURE IN DOING THINGS: 0
SUM OF ALL RESPONSES TO PHQ QUESTIONS 1-9: 0
2. FEELING DOWN, DEPRESSED OR HOPELESS: 0

## 2022-10-18 ASSESSMENT — SOCIAL DETERMINANTS OF HEALTH (SDOH): HOW HARD IS IT FOR YOU TO PAY FOR THE VERY BASICS LIKE FOOD, HOUSING, MEDICAL CARE, AND HEATING?: NOT HARD AT ALL

## 2022-10-18 NOTE — PATIENT INSTRUCTIONS
SURVEY:    You may be receiving a survey from [employer] regarding your visit today. You may get this in the mail, through your MyChart or in your email. Please complete the survey to enable us to provide the highest quality of care to you and your family. If you cannot score us as very good ( 5 Stars) on any question, please feel free to call the office to discuss how we could have made your experience exceptional.     Thank you. Clinical Care Team:  DO Damon Polanco Arm, [address] [place] [address] Team:  [facility]                                Personalized Preventive Plan for Berkley Colon - 10/18/2022  Medicare offers a range of preventive health benefits. Some of the tests and screenings are paid in full while other may be subject to a deductible, co-insurance, and/or copay. Some of these benefits include a comprehensive review of your medical history including lifestyle, illnesses that may run in your family, and various assessments and screenings as appropriate. After reviewing your medical record and screening and assessments performed today your provider may have ordered immunizations, labs, imaging, and/or referrals for you. A list of these orders (if applicable) as well as your Preventive Care list are included within your After Visit Summary for your review. Other Preventive Recommendations:    A preventive eye exam performed by an eye specialist is recommended every 1-2 years to screen for glaucoma; cataracts, macular degeneration, and other eye disorders. A preventive dental visit is recommended every 6 months. Try to get at least 150 minutes of exercise per week or 10,000 steps per day on a pedometer . Order or download the FREE \"Exercise & Physical Activity: Your Everyday Guide\" from [employer]. Call 2-425.821.6010 or search [employer] online.   You need 4791-4391 mg of calcium and 1633-0841 IU of vitamin D per day. It is possible to meet your calcium requirement with diet alone, but a vitamin D supplement is usually necessary to meet this goal.  When exposed to the sun, use a sunscreen that protects against both UVA and UVB radiation with an SPF of 30 or greater. Reapply every 2 to 3 hours or after sweating, drying off with a towel, or swimming. Always wear a seat belt when traveling in a car. Always wear a helmet when riding a bicycle or motorcycle.

## 2022-10-18 NOTE — PROGRESS NOTES
Medicare Annual Wellness Visit    Monik Finney is here for Medicare AWV, Hypothyroidism, Incontinence (Flomax didn't help), and Arm Pain Hardik Mooring last February, still having pain and limited rom in left arm/ shoulder)    Assessment & Plan   Medicare annual wellness visit, subsequent  Need for influenza vaccination  -     Influenza, FLUAD, (age 72 y+), IM, Preservative Free, 0.5 mL  Urge incontinence of urine  -     US RETROPERITONEAL COMPLETE; Future  Nocturia  -     US RETROPERITONEAL COMPLETE; Future  Traumatic tear of left rotator cuff, unspecified tear extent, subsequent encounter  -     MRI SHOULDER LEFT WO CONTRAST; Future  Shoulder weakness  -     MRI SHOULDER LEFT WO CONTRAST; Future  Chronic left shoulder pain  -     MRI SHOULDER LEFT WO CONTRAST; Future    Recommendations for Preventive Services Due: see orders and patient instructions/AVS.  Recommended screening schedule for the next 5-10 years is provided to the patient in written form: see Patient Instructions/AVS.     Return for Medicare Annual Wellness Visit in 1 year. Subjective       Patient's complete Health Risk Assessment and screening values have been reviewed and are found in Flowsheets. The following problems were reviewed today and where indicated follow up appointments were made and/or referrals ordered. Positive Risk Factor Screenings with Interventions:    Fall Risk:  Do you feel unsteady or are you worried about falling? : no  2 or more falls in past year?: no  Fall with injury in past year?: (!) yes   Fall Risk Interventions:    Home safety tips provided                       Objective   Vitals:    10/18/22 1002   BP: 130/80   Pulse: 54   SpO2: 99%   Weight: 197 lb (89.4 kg)   Height: 5' 8.5\" (1.74 m)      Body mass index is 29.52 kg/m².              Allergies   Allergen Reactions    Erythromycin Other (See Comments)     Swelling of mouth, mouth sores    Isosorbide Nitrate Other (See Comments)     Migraine headaches     Prior to Visit Medications    Medication Sig Taking? Authorizing Provider   Omega-3 Fatty Acids (FISH OIL) 1000 MG capsule   Historical Provider, MD   atorvastatin (LIPITOR) 40 MG tablet Take 1 tablet by mouth daily  Alma Zavala DO   levothyroxine (SYNTHROID) 150 MCG tablet Take 1 tablet by mouth every morning (before breakfast)  Alma Zavala DO   pantoprazole (PROTONIX) 40 MG tablet TAKE 1 TABLET BY MOUTH TWICE DAILY  Alma Zavala DO   Magnesium 500 MG CAPS Take by mouth  Historical Provider, MD   Nerve Stimulator (EMJOI TENS) JOSI Use as directed.   Include 2 sets of pads, with 3 refills  Indications: Carola Paez MD       [facility] (Including outside providers/suppliers regularly involved in providing care):   Patient Care Team:  Alma Zavala DO as PCP - [address] [place], [place] [address]DO as PCP - [facility] Empaneled Provider     Reviewed and updated this visit:  Allergies  Meds  Problems

## 2022-10-18 NOTE — PROGRESS NOTES
Name: Earlene Valdes  : 1950         Chief Complaint:     Chief Complaint   Patient presents with    Medicare AWV    Hypothyroidism    Incontinence     Flomax didn't help    Arm Pain     [place] last February, still having pain and limited rom in left arm/ shoulder       History of Present Illness:      Earlene Valdes is a 71 y.o.  male who presents with Medicare AWV, Hypothyroidism, Incontinence (Flomax didn't help), and Arm Pain [place] last February, still having pain and limited rom in left arm/ shoulder)      HPI    Several mos ago fell down stairs onto L shoulder, was seen in ER, neg XR. Still having some trouble with it, particularly external rotation, pain and decreased range of motion. Does exercises that he was given by ER. Still has issues with bladder, urgency and can leak while trying to hold it, also nocturia. He had taken Flomax for a number of years, decided to stop since he was still having trouble and then found that after stopping his trouble did not get any worse. Would like to try something else. Doing well otherwise, taking meds as directed for hyperlipidemia, hypothyroid and GERD. Recent labs. Medical History:     Patient Active Problem List   Diagnosis    Hypothyroidism    Pure hypercholesterolemia    Arthritis    Tendinitis of right wrist    PUD (peptic ulcer disease)    Skin tag of perianal region    Scalp psoriasis       Medications:       Prior to Admission medications    Medication Sig Start Date End Date Taking?  Authorizing Provider   Omega-3 Fatty Acids (FISH OIL) 1000 MG capsule     Historical Provider, MD   atorvastatin (LIPITOR) 40 MG tablet Take 1 tablet by mouth daily 10/11/22   Barry Corporal, DO   levothyroxine (SYNTHROID) 150 MCG tablet Take 1 tablet by mouth every morning (before breakfast) 10/11/22   Barry Corporal, DO   pantoprazole (PROTONIX) 40 MG tablet TAKE 1 TABLET BY MOUTH TWICE DAILY 22   Barry Corporal, DO   Magnesium 500 MG CAPS Take by mouth    Historical Provider, MD   Nerve Stimulator (FAVIOLA TENS) JOSI Use as directed. Include 2 sets of pads, with 3 refills  Indications: Lower Backache 11/28/11   Ni Gillette MD        Allergies:       Erythromycin and Isosorbide nitrate    Physical Exam:     Vitals:  /80   Pulse 54   Ht 5' 8.5\" (1.74 m)   Wt 197 lb (89.4 kg)   SpO2 99%   BMI 29.52 kg/m²   Physical Exam  Vitals and nursing note reviewed. Constitutional:       Appearance: He is well-developed. HENT:      Right Ear: Hearing and tympanic membrane normal.      Left Ear: Hearing and tympanic membrane normal.      Nose: Nose normal.      Mouth/Throat:      Mouth: Mucous membranes are moist.      Dentition: Normal dentition. Pharynx: Oropharynx is clear. Eyes:      Extraocular Movements: Extraocular movements intact. Conjunctiva/sclera: Conjunctivae normal.      Pupils: Pupils are equal, round, and reactive to light. Neck:      Thyroid: No thyroid mass or thyromegaly. Cardiovascular:      Rate and Rhythm: Normal rate and regular rhythm. Heart sounds: S1 normal and S2 normal. No murmur heard. Comments: No peripheral edema. Pulmonary:      Effort: Pulmonary effort is normal.      Breath sounds: Normal breath sounds. Abdominal:      General: Bowel sounds are normal.      Palpations: Abdomen is soft. Tenderness: There is no abdominal tenderness. Musculoskeletal:      Comments: Left shoulder normal range of motion, mild clunking with passive abduction past 90 degrees. Positive empty can, 3/5 strength. Negative Gaspar. 3/5 strength of external rotation of the shoulder with elbow down against torso. 5/5 strength with these maneuvers on the right arm   Lymphadenopathy:      Cervical: No cervical adenopathy. Skin:     General: Skin is warm and dry. Findings: No rash. Neurological:      Mental Status: He is alert and oriented to person, place, and time.    Psychiatric:         Mood and Affect: Mood normal.         Behavior: Behavior normal. Behavior is cooperative. Data:     Lab Results   Component Value Date/Time     10/14/2022 08:43 AM    K 4.4 10/14/2022 08:43 AM     10/14/2022 08:43 AM    CO2 27 10/14/2022 08:43 AM    BUN 15 10/14/2022 08:43 AM    CREATININE 1.04 10/14/2022 08:43 AM    GLUCOSE 104 10/14/2022 08:43 AM    GLUCOSE 94 11/09/2011 08:24 AM    PROT 6.4 10/14/2022 08:43 AM    LABALBU 4.0 10/14/2022 08:43 AM    LABALBU 3.9 11/09/2011 08:24 AM    BILITOT 1.3 10/14/2022 08:43 AM    ALKPHOS 93 10/14/2022 08:43 AM    AST 30 10/14/2022 08:43 AM    ALT 34 10/14/2022 08:43 AM     Lab Results   Component Value Date/Time    WBC 8.6 11/02/2017 12:00 AM    RBC 5.23 11/02/2017 12:00 AM    RBC 4.91 11/09/2011 08:24 AM    HGB 15.0 11/02/2017 12:00 AM    HCT 45.4 11/02/2017 12:00 AM    MCV 86.8 11/02/2017 12:00 AM    MCH 28.7 11/02/2017 12:00 AM    MCHC 33.0 11/02/2017 12:00 AM    RDW 13.5 11/02/2017 12:00 AM     11/02/2017 12:00 AM     11/09/2011 08:24 AM    MPV NOT REPORTED 10/21/2016 07:34 AM     Lab Results   Component Value Date/Time    TSH 0.56 10/14/2022 08:43 AM     Lab Results   Component Value Date/Time    CHOL 146 10/14/2022 08:43 AM    CHOL 155 11/02/2017 12:00 AM    HDL 69 10/14/2022 08:43 AM    PSA 1.94 10/14/2022 08:43 AM         Assessment & Plan:        Diagnosis Orders   1. Medicare annual wellness visit, subsequent        2. Need for influenza vaccination  Influenza, FLUAD, (age 72 y+), IM, Preservative Free, 0.5 mL      3. Urge incontinence of urine  US RETROPERITONEAL COMPLETE      4. Nocturia  US RETROPERITONEAL COMPLETE      5. Traumatic tear of left rotator cuff, unspecified tear extent, subsequent encounter  MRI SHOULDER LEFT WO CONTRAST      6. Shoulder weakness  MRI SHOULDER LEFT WO CONTRAST      7. Chronic left shoulder pain  MRI SHOULDER LEFT WO CONTRAST      8. Acquired hypothyroidism        9. Pure hypercholesterolemia          3-4.   Ongoing difficulty with urination. Normal PSA and turns out it did not respond to Flomax. Checking ultrasound for postvoid residual, hydronephrosis. Consider Proscar versus Ditropan.  5-7. Weakness of the left shoulder, pain, difficulty with external rotation, since falling onto it in January of this year. Has failed home exercises and does have weakness on exam.  MRI ordered for further evaluation of the rotator cuff. 8.  Hypothyroid controlled, continue same Rx  9.   Hyperlipidemia also doing well, continue statin.        signed by Frank Reese DO on 10/18/2022 at 1:17 PM  [address] [address]  Dept: [phone]

## 2022-10-27 ENCOUNTER — HOSPITAL ENCOUNTER (OUTPATIENT)
Dept: ULTRASOUND IMAGING | Age: 72
Discharge: HOME | End: 2022-10-29
Payer: MEDICARE

## 2022-10-27 ENCOUNTER — HOSPITAL ENCOUNTER (OUTPATIENT)
Dept: MRI IMAGING | Age: 72
Discharge: HOME | End: 2022-10-29
Payer: MEDICARE

## 2022-10-27 DIAGNOSIS — G89.29 CHRONIC LEFT SHOULDER PAIN: ICD-10-CM

## 2022-10-27 DIAGNOSIS — M25.512 CHRONIC LEFT SHOULDER PAIN: ICD-10-CM

## 2022-10-27 DIAGNOSIS — N39.41 URGE INCONTINENCE OF URINE: ICD-10-CM

## 2022-10-27 DIAGNOSIS — S46.012D TRAUMATIC TEAR OF LEFT ROTATOR CUFF, UNSPECIFIED TEAR EXTENT, SUBSEQUENT ENCOUNTER: ICD-10-CM

## 2022-10-27 DIAGNOSIS — R35.1 NOCTURIA: ICD-10-CM

## 2022-10-27 DIAGNOSIS — R29.898 SHOULDER WEAKNESS: ICD-10-CM

## 2022-10-27 PROCEDURE — 76770 US EXAM ABDO BACK WALL COMP: CPT

## 2022-10-27 PROCEDURE — 73221 MRI JOINT UPR EXTREM W/O DYE: CPT

## 2022-10-28 ENCOUNTER — TELEPHONE (OUTPATIENT)
Dept: FAMILY MEDICINE CLINIC | Age: 72
End: 2022-10-28

## 2022-10-28 DIAGNOSIS — S46.012D TRAUMATIC TEAR OF LEFT ROTATOR CUFF, UNSPECIFIED TEAR EXTENT, SUBSEQUENT ENCOUNTER: Primary | ICD-10-CM

## 2022-10-28 RX ORDER — FINASTERIDE 5 MG/1
5 TABLET, FILM COATED ORAL DAILY
Qty: 30 TABLET | Refills: 3 | Status: SHIPPED | OUTPATIENT
Start: 2022-10-28

## 2022-10-28 NOTE — TELEPHONE ENCOUNTER
----- Message from Benson Lara DO sent at 10/27/2022  4:25 PM EDT -----  Does have some urine retention, lucille after first attempt. Can try another med (proscar) or see urology.

## 2022-10-28 NOTE — TELEPHONE ENCOUNTER
----- Message from Darlene Chung DO sent at 10/28/2022 12:46 PM EDT -----  Massive full-thickness rupture of 2 of the rotator cuff muscles and the muscle is pulled back pretty far. Starting to have a little bit of the riding up of the ball-and-socket joint as we had discussed could happen over time. Hopefully this can be repaired.   Recommend Ortho referral.

## 2022-10-31 ENCOUNTER — PATIENT MESSAGE (OUTPATIENT)
Dept: FAMILY MEDICINE CLINIC | Age: 72
End: 2022-10-31

## 2022-10-31 DIAGNOSIS — S46.012D TRAUMATIC TEAR OF LEFT ROTATOR CUFF, UNSPECIFIED TEAR EXTENT, SUBSEQUENT ENCOUNTER: Primary | ICD-10-CM

## 2022-10-31 DIAGNOSIS — S46.012D TRAUMATIC COMPLETE TEAR OF LEFT ROTATOR CUFF, SUBSEQUENT ENCOUNTER: ICD-10-CM

## 2022-10-31 NOTE — TELEPHONE ENCOUNTER
From: Mony Solomon  To: Dr. Álvaro Johnson: 10/31/2022 11:10 AM EDT  Subject: shoulder    i think i would like to see Dr Bren Salcido for my Shoulder

## 2022-11-10 RX ORDER — PANTOPRAZOLE SODIUM 40 MG/1
TABLET, DELAYED RELEASE ORAL
Qty: 180 TABLET | Refills: 1 | Status: SHIPPED | OUTPATIENT
Start: 2022-11-10

## 2022-11-10 NOTE — TELEPHONE ENCOUNTER
Last OV: 10/18/2022 AWV   Last RX:    Next scheduled apt: Visit date not found          Pt requesting a refill

## 2023-02-23 RX ORDER — FINASTERIDE 5 MG/1
5 TABLET, FILM COATED ORAL DAILY
Qty: 90 TABLET | Refills: 3 | Status: SHIPPED | OUTPATIENT
Start: 2023-02-23

## 2023-05-30 RX ORDER — ATORVASTATIN CALCIUM 40 MG/1
TABLET, FILM COATED ORAL
Qty: 90 TABLET | Refills: 1 | Status: SHIPPED | OUTPATIENT
Start: 2023-05-30

## 2023-05-30 NOTE — TELEPHONE ENCOUNTER
Last visit:  10/18/2022  Next Visit Date:  No future appointments. Medication List:  Prior to Admission medications    Medication Sig Start Date End Date Taking? Authorizing Provider   levothyroxine (SYNTHROID) 150 MCG tablet Take 1 tablet by mouth every morning (before breakfast) 4/10/23   Bernard Vásquez MD   finasteride (PROSCAR) 5 MG tablet Take 1 tablet by mouth daily 2/23/23   Sherry Furrow, DO   pantoprazole (PROTONIX) 40 MG tablet TAKE 1 TABLET BY MOUTH TWICE DAILY 11/10/22   Sherry Furrow, DO   Omega-3 Fatty Acids (FISH OIL) 1000 MG capsule     Historical Provider, MD   atorvastatin (LIPITOR) 40 MG tablet Take 1 tablet by mouth daily 10/11/22   Sherry Furrow, DO   Magnesium 500 MG CAPS Take by mouth    Historical Provider, MD   Nerve Stimulator (EMJOI TENS) JOSI Use as directed.   Include 2 sets of pads, with 3 refills  Indications: Lower Backache 11/28/11   Ashanti Carrillo MD

## 2023-11-13 DIAGNOSIS — Z12.5 PROSTATE CANCER SCREENING: ICD-10-CM

## 2023-11-13 DIAGNOSIS — E78.00 PURE HYPERCHOLESTEROLEMIA: Primary | ICD-10-CM

## 2023-11-13 DIAGNOSIS — E03.9 ACQUIRED HYPOTHYROIDISM: ICD-10-CM

## 2023-11-13 RX ORDER — LEVOTHYROXINE SODIUM 0.15 MG/1
150 TABLET ORAL
Qty: 90 TABLET | Refills: 1 | Status: SHIPPED | OUTPATIENT
Start: 2023-11-13

## 2023-11-13 SDOH — HEALTH STABILITY: PHYSICAL HEALTH: ON AVERAGE, HOW MANY MINUTES DO YOU ENGAGE IN EXERCISE AT THIS LEVEL?: 30 MIN

## 2023-11-13 SDOH — HEALTH STABILITY: PHYSICAL HEALTH: ON AVERAGE, HOW MANY DAYS PER WEEK DO YOU ENGAGE IN MODERATE TO STRENUOUS EXERCISE (LIKE A BRISK WALK)?: 3 DAYS

## 2023-11-13 ASSESSMENT — LIFESTYLE VARIABLES
HOW MANY STANDARD DRINKS CONTAINING ALCOHOL DO YOU HAVE ON A TYPICAL DAY: 1 OR 2
HOW MANY STANDARD DRINKS CONTAINING ALCOHOL DO YOU HAVE ON A TYPICAL DAY: 1
HOW OFTEN DO YOU HAVE A DRINK CONTAINING ALCOHOL: 2-3 TIMES A WEEK
HOW OFTEN DO YOU HAVE A DRINK CONTAINING ALCOHOL: 4
HOW OFTEN DO YOU HAVE SIX OR MORE DRINKS ON ONE OCCASION: 1

## 2023-11-13 ASSESSMENT — PATIENT HEALTH QUESTIONNAIRE - PHQ9
SUM OF ALL RESPONSES TO PHQ QUESTIONS 1-9: 0
SUM OF ALL RESPONSES TO PHQ QUESTIONS 1-9: 0
SUM OF ALL RESPONSES TO PHQ9 QUESTIONS 1 & 2: 0
SUM OF ALL RESPONSES TO PHQ QUESTIONS 1-9: 0
1. LITTLE INTEREST OR PLEASURE IN DOING THINGS: 0
SUM OF ALL RESPONSES TO PHQ QUESTIONS 1-9: 0
2. FEELING DOWN, DEPRESSED OR HOPELESS: 0

## 2023-11-17 ENCOUNTER — HOSPITAL ENCOUNTER (OUTPATIENT)
Age: 73
Discharge: HOME | End: 2023-11-17
Payer: MEDICARE

## 2023-11-17 DIAGNOSIS — E78.00 PURE HYPERCHOLESTEROLEMIA: ICD-10-CM

## 2023-11-17 DIAGNOSIS — Z12.5 PROSTATE CANCER SCREENING: ICD-10-CM

## 2023-11-17 DIAGNOSIS — E03.9 ACQUIRED HYPOTHYROIDISM: ICD-10-CM

## 2023-11-17 LAB
ALBUMIN SERPL-MCNC: 4.2 G/DL (ref 3.5–5.2)
ALP SERPL-CCNC: 99 U/L (ref 40–129)
ALT SERPL-CCNC: 25 U/L (ref 5–41)
ANION GAP SERPL CALCULATED.3IONS-SCNC: 9 MMOL/L (ref 9–17)
AST SERPL-CCNC: 25 U/L
BILIRUB SERPL-MCNC: 1.4 MG/DL (ref 0.3–1.2)
BUN SERPL-MCNC: 19 MG/DL (ref 8–23)
BUN/CREAT SERPL: 17 (ref 9–20)
CALCIUM SERPL-MCNC: 9 MG/DL (ref 8.6–10.4)
CHLORIDE SERPL-SCNC: 103 MMOL/L (ref 98–107)
CHOLEST SERPL-MCNC: 140 MG/DL (ref 0–199)
CHOLESTEROL/HDL RATIO: 2
CO2 SERPL-SCNC: 25 MMOL/L (ref 20–31)
CREAT SERPL-MCNC: 1.1 MG/DL (ref 0.7–1.2)
GFR SERPL CREATININE-BSD FRML MDRD: >60 ML/MIN/1.73M2
GLUCOSE SERPL-MCNC: 118 MG/DL (ref 70–99)
HDLC SERPL-MCNC: 74 MG/DL
LDLC SERPL CALC-MCNC: 58 MG/DL (ref 0–100)
POTASSIUM SERPL-SCNC: 4.4 MMOL/L (ref 3.7–5.3)
PROT SERPL-MCNC: 6.5 G/DL (ref 6.4–8.3)
PSA SERPL-MCNC: 1.2 NG/ML (ref 0–4)
SODIUM SERPL-SCNC: 137 MMOL/L (ref 135–144)
TRIGL SERPL-MCNC: 38 MG/DL (ref 0–149)
TSH SERPL DL<=0.05 MIU/L-ACNC: 1.9 UIU/ML (ref 0.3–5)
VLDLC SERPL CALC-MCNC: 8 MG/DL

## 2023-11-17 PROCEDURE — 36415 COLL VENOUS BLD VENIPUNCTURE: CPT

## 2023-11-17 PROCEDURE — G0103 PSA SCREENING: HCPCS

## 2023-11-17 PROCEDURE — 80053 COMPREHEN METABOLIC PANEL: CPT

## 2023-11-17 PROCEDURE — 80061 LIPID PANEL: CPT

## 2023-11-17 PROCEDURE — 84443 ASSAY THYROID STIM HORMONE: CPT

## 2023-11-19 SDOH — ECONOMIC STABILITY: INCOME INSECURITY: HOW HARD IS IT FOR YOU TO PAY FOR THE VERY BASICS LIKE FOOD, HOUSING, MEDICAL CARE, AND HEATING?: NOT HARD AT ALL

## 2023-11-19 SDOH — ECONOMIC STABILITY: TRANSPORTATION INSECURITY
IN THE PAST 12 MONTHS, HAS LACK OF TRANSPORTATION KEPT YOU FROM MEETINGS, WORK, OR FROM GETTING THINGS NEEDED FOR DAILY LIVING?: NO

## 2023-11-19 SDOH — ECONOMIC STABILITY: FOOD INSECURITY: WITHIN THE PAST 12 MONTHS, YOU WORRIED THAT YOUR FOOD WOULD RUN OUT BEFORE YOU GOT MONEY TO BUY MORE.: NEVER TRUE

## 2023-11-19 SDOH — ECONOMIC STABILITY: FOOD INSECURITY: WITHIN THE PAST 12 MONTHS, THE FOOD YOU BOUGHT JUST DIDN'T LAST AND YOU DIDN'T HAVE MONEY TO GET MORE.: NEVER TRUE

## 2023-11-19 SDOH — ECONOMIC STABILITY: HOUSING INSECURITY
IN THE LAST 12 MONTHS, WAS THERE A TIME WHEN YOU DID NOT HAVE A STEADY PLACE TO SLEEP OR SLEPT IN A SHELTER (INCLUDING NOW)?: NO

## 2023-11-22 ENCOUNTER — OFFICE VISIT (OUTPATIENT)
Dept: FAMILY MEDICINE CLINIC | Age: 73
End: 2023-11-22

## 2023-11-22 VITALS
HEIGHT: 69 IN | OXYGEN SATURATION: 95 % | BODY MASS INDEX: 28.88 KG/M2 | WEIGHT: 195 LBS | SYSTOLIC BLOOD PRESSURE: 138 MMHG | HEART RATE: 61 BPM | DIASTOLIC BLOOD PRESSURE: 82 MMHG

## 2023-11-22 DIAGNOSIS — K21.9 GASTROESOPHAGEAL REFLUX DISEASE WITHOUT ESOPHAGITIS: ICD-10-CM

## 2023-11-22 DIAGNOSIS — Z12.12 ENCOUNTER FOR COLORECTAL CANCER SCREENING: ICD-10-CM

## 2023-11-22 DIAGNOSIS — R35.0 BENIGN PROSTATIC HYPERPLASIA WITH URINARY FREQUENCY: ICD-10-CM

## 2023-11-22 DIAGNOSIS — N40.1 BENIGN PROSTATIC HYPERPLASIA WITH URINARY FREQUENCY: ICD-10-CM

## 2023-11-22 DIAGNOSIS — E78.00 PURE HYPERCHOLESTEROLEMIA: ICD-10-CM

## 2023-11-22 DIAGNOSIS — Z12.11 ENCOUNTER FOR COLORECTAL CANCER SCREENING: ICD-10-CM

## 2023-11-22 DIAGNOSIS — Z00.00 MEDICARE ANNUAL WELLNESS VISIT, SUBSEQUENT: Primary | ICD-10-CM

## 2023-11-22 DIAGNOSIS — E03.9 ACQUIRED HYPOTHYROIDISM: ICD-10-CM

## 2023-11-22 ASSESSMENT — PATIENT HEALTH QUESTIONNAIRE - PHQ9
SUM OF ALL RESPONSES TO PHQ QUESTIONS 1-9: 0
SUM OF ALL RESPONSES TO PHQ9 QUESTIONS 1 & 2: 0
2. FEELING DOWN, DEPRESSED OR HOPELESS: 0
1. LITTLE INTEREST OR PLEASURE IN DOING THINGS: 0
SUM OF ALL RESPONSES TO PHQ QUESTIONS 1-9: 0

## 2023-11-22 ASSESSMENT — LIFESTYLE VARIABLES
HOW OFTEN DO YOU HAVE A DRINK CONTAINING ALCOHOL: 2-3 TIMES A WEEK
HOW MANY STANDARD DRINKS CONTAINING ALCOHOL DO YOU HAVE ON A TYPICAL DAY: 1 OR 2

## 2023-11-22 NOTE — PROGRESS NOTES
Name: Yuval Dumas  : 1950         Chief Complaint:     Chief Complaint   Patient presents with    Medicare AWV     Annual Wellness Visit. Hypothyroidism    Hypertension       History of Present Illness:      Yuval Dumas is a 68 y.o.  male who presents with Medicare AWV (Annual Wellness Visit. ), Hypothyroidism, and Hypertension      HPI    L shoulder improved, saw ortho and was told RC tear likely chronic/old but just inflamed. R wrist is what bothers the most, gets shots intermittently. Was offered fusion but he doesn't want to lose the ROM. HLD on lipitor as directed. Working on diet. Gayatri 1-2 reg beers in a week. Has found N/A beer that he likes so he goes with that more often. BPH - sometimes will urinate ok, other times has more trouble, seems to be r/t amt of water he drinks - worse if he drinks more fluids. Main issue is difficulty holding urine when he has to go. Proscar works better than flomax. Drinks over 10 bottles of water some days. GERD in ok control, protonix daily. H/o esophageal stricture, ulcers. Medical History:     Patient Active Problem List   Diagnosis    Hypothyroidism    Pure hypercholesterolemia    Arthritis    Tendinitis of right wrist    PUD (peptic ulcer disease)    Skin tag of perianal region    Scalp psoriasis       Medications:       Prior to Admission medications    Medication Sig Start Date End Date Taking?  Authorizing Provider   levothyroxine (SYNTHROID) 150 MCG tablet Take 1 tablet by mouth every morning (before breakfast) 23  Yes [facility]ile[facility], DO   pantoprazole (PROTONIX) 40 MG tablet TAKE 1 TABLET BY MOUTH TWICE DAILY 23  Yes Pamela HARDWICK, DO   atorvastatin (LIPITOR) 40 MG tablet Take 1 tablet by mouth daily 23  Yes Brielle Mix, DO   Omega-3 Fatty Acids (FISH OIL) 1000 MG capsule    Yes Provider, MD Leydi   Magnesium 500 MG CAPS Take by mouth   Yes Provider, MD Leydi   Nerve Stimulator (EMJOI TENS)

## 2023-12-14 ENCOUNTER — TELEPHONE (OUTPATIENT)
Dept: UROLOGY | Age: 73
End: 2023-12-14

## 2023-12-14 ENCOUNTER — OFFICE VISIT (OUTPATIENT)
Dept: SURGERY | Age: 73
End: 2023-12-14
Payer: MEDICARE

## 2023-12-14 VITALS
WEIGHT: 195 LBS | SYSTOLIC BLOOD PRESSURE: 128 MMHG | DIASTOLIC BLOOD PRESSURE: 75 MMHG | HEART RATE: 48 BPM | BODY MASS INDEX: 28.8 KG/M2

## 2023-12-14 DIAGNOSIS — Z12.11 COLON CANCER SCREENING: ICD-10-CM

## 2023-12-14 DIAGNOSIS — R13.19 ESOPHAGEAL DYSPHAGIA: ICD-10-CM

## 2023-12-14 DIAGNOSIS — K21.9 GASTROESOPHAGEAL REFLUX DISEASE, UNSPECIFIED WHETHER ESOPHAGITIS PRESENT: Primary | ICD-10-CM

## 2023-12-14 PROCEDURE — G8419 CALC BMI OUT NRM PARAM NOF/U: HCPCS | Performed by: SURGERY

## 2023-12-14 PROCEDURE — 99203 OFFICE O/P NEW LOW 30 MIN: CPT | Performed by: SURGERY

## 2023-12-14 PROCEDURE — G8484 FLU IMMUNIZE NO ADMIN: HCPCS | Performed by: SURGERY

## 2023-12-14 PROCEDURE — G8427 DOCREV CUR MEDS BY ELIG CLIN: HCPCS | Performed by: SURGERY

## 2023-12-14 PROCEDURE — 1123F ACP DISCUSS/DSCN MKR DOCD: CPT | Performed by: SURGERY

## 2023-12-14 PROCEDURE — 3017F COLORECTAL CA SCREEN DOC REV: CPT | Performed by: SURGERY

## 2023-12-14 PROCEDURE — 1036F TOBACCO NON-USER: CPT | Performed by: SURGERY

## 2023-12-14 NOTE — TELEPHONE ENCOUNTER
SURGERY SCHEDULING FORM   [address] [place], [place]    Phone *[phone]   Surgical Scheduling Direct Line Phone *[phone] Fax *[phone]      David Paz 1950 male         Home Phone: [phone]      Cell Phone:    Telephone Information:   Mobile [phone]          Surgeon: Dr. Deng                 Surgery Date: 12/27/23             Time: TBD    Procedure: EGD with possible balloon dilation    Diagnosis: esophageal stenosis    Important Medical History:  In Epic    Special Inst/Equip: Regular    CPT Codes:    92323  Latex Allergy: No     Cardiac Device:  No    Anesthesia:  MAC          Admission Type:  Same Day                        Admit Prior to Day of Surgery: No              Preadmission Testing:  Phone Call          PAT Date and Time: NA    Need Preop Cardiac Clearance: No    Does Patient have Cardiologist/physician?     NA    : Mary Jane Reeves                         Date: 12/14/23    Insurance Company Name: [employer]

## 2023-12-15 ENCOUNTER — ANESTHESIA EVENT (OUTPATIENT)
Dept: ENDOSCOPY | Age: 73
End: 2023-12-15
Payer: MEDICARE

## 2023-12-15 NOTE — FLOWSHEET NOTE
Pt told he needed to have EKG.  Pt verbalizes understanding and states he will have it done \"in the next couple days\"

## 2023-12-15 NOTE — PROGRESS NOTES
[facility]   Preadmission Testing    Name: Orestes Laboy  : 1950  Patient Phone: [phone] (home)     Procedure: EGD  Date of Procedure: 2023  Surgeon: Dominique Meade MD    Ht:  175.3 cm (5' 9\")  Wt: 88 kg (194 lb)  Wt method: Stated    Allergies: Allergies   Allergen Reactions    Erythromycin Other (See Comments)     Swelling of mouth, mouth sores    Isosorbide Nitrate Other (See Comments)     Migraine headaches                There were no vitals filed for this visit. No LMP for male patient. Do you take blood thinners? [] Yes    [x] No         Instructed to stop blood thinners prior to procedure? [] Yes    [] No      [x] N/A   Do you have sleep apnea? [] Yes    [x] No     Do you have acid reflux ? [x] Yes    [] No     Do you have  hiatal hernia? [] Yes    [x] No    Do you ever experience motion sickness? [] Yes    [x] No     Have you had a respiratory infection or sore throat in last 4 weeks before surgery? [] Yes    [x] No     Do you have poorly controlled asthma or COPD? Difficulty with intubation in past? [] Yes    [x] No      [] Yes    [x] No       Do you have a history of angina in the last month or symptomatic arrhythmia? [] Yes    [x] No     Do you have significant central nervous system disease? [] Yes    [] No     Have you had an EKG, labs, or chest xray in last 12 months? If yes provide copies to anesthesia   [] Yes    [x] No       [] Lab    [] EKG    [] CXR     Have you had a stress test?     [x] Yes    [] No    When/where: years ago at [facility]    Was it normal?    [x] Yes    [] No     Do you or your family have a history of Malignant Hyperthermia? [] Yes    [x] No           Do you smoke? [] Yes    [x] No      Please refrain from smoking on the day of surgery.       Patient instructed on: [x] NPO Status   [x] Meds to Take  [x] Ride Home  [x]No Jewelry/Contact Lenses/Nail [place]  [x] Prep/Lax/Clear Liquids    [] Chlorhexidene     DOS

## 2023-12-26 PROBLEM — K22.2 GERD WITH STRICTURE: Status: ACTIVE | Noted: 2023-12-26

## 2023-12-26 PROBLEM — R13.19 ESOPHAGEAL DYSPHAGIA: Status: ACTIVE | Noted: 2023-12-26

## 2023-12-26 PROBLEM — K21.9 GERD WITH STRICTURE: Status: ACTIVE | Noted: 2023-12-26

## 2023-12-26 NOTE — H&P
Gastroesophageal Reflux  He complains of dysphagia (mild), heartburn and water brash. He reports no abdominal pain, no chest pain, no choking, no coughing, no sore throat or no wheezing. This is a chronic problem. The problem occurs occasionally. The symptoms are aggravated by lying down and certain foods. Pertinent negatives include no anemia, fatigue, melena, muscle weakness, orthopnea or weight loss. Risk factors include ETOH use. He has tried a PPI for the symptoms. The treatment provided significant relief. Past procedures do not include an EGD. Caffeine - 2 cups coffee in the morning  No sodas  Avoids fatty foods, spicy foods, red meat. Eat 2 meals a day. Former smoker and chewing tobacco.  Quit smoking in his late 30's quite chewing in 2015. He also is referred for colon cancer screening. His last colonoscopy in 2018 showed diverticular disease but was otherwise normal.  He denies any history of polyps, nor do I see has every had any. No family history of colon cancer. Past Medical History:   Diagnosis Date    Chronic back pain     Hyperlipidemia     Hypothyroidism     Migraine headache     Multiple gastric ulcers     Osteoarthritis     Right wrist pain      Past Surgical History:   Procedure Laterality Date    CARDIAC CATHETERIZATION Left 08/25/2016    Right Radial/MHT/    CATARACT REMOVAL Bilateral 04/2018    COLONOSCOPY      PA OFFICE/OUTPT VISIT,PROCEDURE ONLY N/A 1/22/2018    COLONOSCOPY performed by Mariela Santos MD at [address] [place] [address] OFFICE/OUTPT [facility] N/A 1/29/2018    ----EXCISION PERIANAL SKIN TAG performed by Mariela Santos MD at [facility] ENDOSCOPY N/A 1/22/2018    EGD BIOPSY performed by Mariela Santos MD at [address] [place] [address]     No current facility-administered medications for this encounter.      Current Outpatient Medications   Medication Sig Dispense Refill    levothyroxine (SYNTHROID) 150 MCG melena.   Genitourinary:  Negative for difficulty urinating, dysuria and frequency.   Musculoskeletal:  Positive for arthralgias. Negative for muscle weakness.   Neurological:  Negative for dizziness, seizures, syncope, speech difficulty, weakness, light-headedness, numbness and headaches.     Ht 1.753 m (5' 9\")   Wt 88 kg (194 lb)   BMI 28.65 kg/m²     Physical Exam  Constitutional:       Appearance: Normal appearance. He is overweight. He is not ill-appearing, toxic-appearing or diaphoretic.   Eyes:      General: No scleral icterus.        Right eye: No discharge.         Left eye: No discharge.      Extraocular Movements: Extraocular movements intact.      Conjunctiva/sclera: Conjunctivae normal.      Pupils: Pupils are equal, round, and reactive to light.   Neck:      Vascular: No carotid bruit.   Cardiovascular:      Rate and Rhythm: Normal rate and regular rhythm.      Pulses: Normal pulses.      Heart sounds: Normal heart sounds.   Pulmonary:      Effort: Pulmonary effort is normal. No respiratory distress.      Breath sounds: Normal breath sounds. No stridor. No wheezing.   Abdominal:      General: Abdomen is flat. Bowel sounds are normal. There is no distension.      Palpations: Abdomen is soft. There is no mass.      Tenderness: There is no abdominal tenderness.      Hernia: No hernia is present.   Musculoskeletal:      Cervical back: Normal range of motion. No rigidity or tenderness.   Lymphadenopathy:      Cervical: No cervical adenopathy.   Skin:     General: Skin is warm and dry.   Neurological:      General: No focal deficit present.      Mental Status: He is alert and oriented to person, place, and time.        IMP/PLAN  1) Dysphagia/GERD/History of esophageal stenosis - Has been quite a while since his last dilation, and only recently has started to has a little difficulty swallowing.  He wants to have it check out before it gets worse, which is what recommend too.  - He does have a few additional

## 2023-12-27 ENCOUNTER — ANESTHESIA (OUTPATIENT)
Dept: ENDOSCOPY | Age: 73
End: 2023-12-27
Payer: MEDICARE

## 2023-12-27 ENCOUNTER — HOSPITAL ENCOUNTER (OUTPATIENT)
Age: 73
Setting detail: OUTPATIENT SURGERY
Discharge: HOME | End: 2023-12-27
Attending: SURGERY | Admitting: SURGERY
Payer: MEDICARE

## 2023-12-27 VITALS
SYSTOLIC BLOOD PRESSURE: 117 MMHG | BODY MASS INDEX: 28.87 KG/M2 | WEIGHT: 194.9 LBS | HEART RATE: 46 BPM | RESPIRATION RATE: 12 BRPM | TEMPERATURE: 97.6 F | DIASTOLIC BLOOD PRESSURE: 78 MMHG | HEIGHT: 69 IN | OXYGEN SATURATION: 95 %

## 2023-12-27 DIAGNOSIS — Z01.818 PRE-OP TESTING: Primary | ICD-10-CM

## 2023-12-27 DIAGNOSIS — K22.2 ESOPHAGEAL STENOSIS: ICD-10-CM

## 2023-12-27 PROCEDURE — 3609017700 HC EGD DILATION GASTRIC/DUODENAL STRICTURE: Performed by: SURGERY

## 2023-12-27 PROCEDURE — 6360000002 HC RX W HCPCS: Performed by: SURGERY

## 2023-12-27 PROCEDURE — 2500000003 HC RX 250 WO HCPCS: Performed by: NURSE ANESTHETIST, CERTIFIED REGISTERED

## 2023-12-27 PROCEDURE — 43239 EGD BIOPSY SINGLE/MULTIPLE: CPT | Performed by: SURGERY

## 2023-12-27 PROCEDURE — 2580000003 HC RX 258: Performed by: SURGERY

## 2023-12-27 PROCEDURE — 43249 ESOPH EGD DILATION <30 MM: CPT | Performed by: SURGERY

## 2023-12-27 PROCEDURE — 3700000001 HC ADD 15 MINUTES (ANESTHESIA): Performed by: SURGERY

## 2023-12-27 PROCEDURE — 6360000002 HC RX W HCPCS: Performed by: NURSE ANESTHETIST, CERTIFIED REGISTERED

## 2023-12-27 PROCEDURE — 3700000000 HC ANESTHESIA ATTENDED CARE: Performed by: SURGERY

## 2023-12-27 PROCEDURE — 2709999900 HC NON-CHARGEABLE SUPPLY: Performed by: SURGERY

## 2023-12-27 PROCEDURE — 7100000011 HC PHASE II RECOVERY - ADDTL 15 MIN: Performed by: SURGERY

## 2023-12-27 PROCEDURE — 2720000010 HC SURG SUPPLY STERILE: Performed by: SURGERY

## 2023-12-27 PROCEDURE — 7100000010 HC PHASE II RECOVERY - FIRST 15 MIN: Performed by: SURGERY

## 2023-12-27 PROCEDURE — 88305 TISSUE EXAM BY PATHOLOGIST: CPT

## 2023-12-27 RX ORDER — FENTANYL CITRATE 50 UG/ML
INJECTION, SOLUTION INTRAMUSCULAR; INTRAVENOUS PRN
Status: DISCONTINUED | OUTPATIENT
Start: 2023-12-27 | End: 2023-12-27 | Stop reason: SDUPTHER

## 2023-12-27 RX ORDER — PANTOPRAZOLE SODIUM 40 MG/1
40 TABLET, DELAYED RELEASE ORAL DAILY
Qty: 180 TABLET | Refills: 1 | Status: SHIPPED | OUTPATIENT
Start: 2023-12-27

## 2023-12-27 RX ORDER — MIDAZOLAM HYDROCHLORIDE 1 MG/ML
INJECTION INTRAMUSCULAR; INTRAVENOUS PRN
Status: DISCONTINUED | OUTPATIENT
Start: 2023-12-27 | End: 2023-12-27 | Stop reason: SDUPTHER

## 2023-12-27 RX ORDER — PROPOFOL 10 MG/ML
INJECTION, EMULSION INTRAVENOUS PRN
Status: DISCONTINUED | OUTPATIENT
Start: 2023-12-27 | End: 2023-12-27 | Stop reason: SDUPTHER

## 2023-12-27 RX ORDER — LIDOCAINE HYDROCHLORIDE 10 MG/ML
INJECTION, SOLUTION EPIDURAL; INFILTRATION; INTRACAUDAL; PERINEURAL PRN
Status: DISCONTINUED | OUTPATIENT
Start: 2023-12-27 | End: 2023-12-27 | Stop reason: SDUPTHER

## 2023-12-27 RX ORDER — CEFAZOLIN SODIUM 2 G/50ML
2000 SOLUTION INTRAVENOUS
Status: COMPLETED | OUTPATIENT
Start: 2023-12-27 | End: 2023-12-27

## 2023-12-27 RX ORDER — SODIUM CHLORIDE, SODIUM LACTATE, POTASSIUM CHLORIDE, CALCIUM CHLORIDE 600; 310; 30; 20 MG/100ML; MG/100ML; MG/100ML; MG/100ML
INJECTION, SOLUTION INTRAVENOUS CONTINUOUS
Status: DISCONTINUED | OUTPATIENT
Start: 2023-12-27 | End: 2023-12-27 | Stop reason: HOSPADM

## 2023-12-27 RX ADMIN — MIDAZOLAM 2 MG: 1 INJECTION INTRAMUSCULAR; INTRAVENOUS at 08:10

## 2023-12-27 RX ADMIN — FENTANYL CITRATE 50 MCG: 50 INJECTION, SOLUTION INTRAMUSCULAR; INTRAVENOUS at 08:10

## 2023-12-27 RX ADMIN — CEFAZOLIN SODIUM 2000 MG: 2 SOLUTION INTRAVENOUS at 08:04

## 2023-12-27 RX ADMIN — SODIUM CHLORIDE, POTASSIUM CHLORIDE, SODIUM LACTATE AND CALCIUM CHLORIDE: 600; 310; 30; 20 INJECTION, SOLUTION INTRAVENOUS at 07:17

## 2023-12-27 RX ADMIN — FENTANYL CITRATE 50 MCG: 50 INJECTION, SOLUTION INTRAMUSCULAR; INTRAVENOUS at 08:14

## 2023-12-27 RX ADMIN — PROPOFOL 50 MG: 10 INJECTION, EMULSION INTRAVENOUS at 08:23

## 2023-12-27 RX ADMIN — PROPOFOL 50 MG: 10 INJECTION, EMULSION INTRAVENOUS at 08:19

## 2023-12-27 RX ADMIN — LIDOCAINE HYDROCHLORIDE 50 MG: 10 INJECTION, SOLUTION EPIDURAL; INFILTRATION; INTRACAUDAL; PERINEURAL at 08:14

## 2023-12-27 RX ADMIN — PROPOFOL 100 MG: 10 INJECTION, EMULSION INTRAVENOUS at 08:14

## 2023-12-27 ASSESSMENT — PAIN - FUNCTIONAL ASSESSMENT
PAIN_FUNCTIONAL_ASSESSMENT: NONE - DENIES PAIN
PAIN_FUNCTIONAL_ASSESSMENT: NONE - DENIES PAIN

## 2023-12-27 NOTE — DISCHARGE INSTRUCTIONS
1) Your esophagus looks fairly normal  2) Dilation and biopsies were done. You will be called with the biopsy results  3) If you continue to have difficulty swallowing, there is other testing that can be done. I don't think you had a significant stenosis (narrowing) of your esophagus. 4) You can decrease the dose of your PPI, pantoprazole (Protonix), to once a day. 5) Continue with anti-reflux life style and diet changes. 6) Follow up with if you would like. 7) Make sure you are able to swallow liquids and soft food before trying anything more substantial         Esophageal Dilation: What to Expect at [address] [place] [address] Harrison had an esophageal dilation. This procedure can open up narrow areas of the esophagus. After the procedure, you will stay at the hospital or surgery center for 1 to 2 hours. This will allow the medicine to wear off. You will be able to go home after your doctor or nurse checks to make sure that you're not having any problems. This care sheet gives you a general idea about how long it will take for you to recover. But each person recovers at a different pace. Follow the steps below to get better as quickly as possible. How can you care for yourself at home? Activity    Rest as much as you need to after you go home. You should be able to go back to your usual activities the day after the procedure. Diet    Follow your doctor's directions for eating after the procedure. Drink plenty of fluids (unless your doctor has told you not to). Medicines    Your doctor will tell you if and when you can restart your medicines. He or she will also give you instructions about taking any new medicines. If you stopped taking aspirin or some other blood thinner, your doctor will tell you when to start taking it again. If you have a sore throat the day after the procedure, use an over-the-counter spray to numb your throat.  Sucking on throat lozenges and gargling with warm salt water may also help relieve your symptoms.   Follow-up care is a key part of your treatment and safety. Be sure to make and go to all appointments, and call your doctor if you are having problems. It's also a good idea to know your test results and keep a list of the medicines you take.  When should you call for help?   Call 911 anytime you think you may need emergency care. For example, call if:    You passed out (lost consciousness).     You have trouble breathing.     Your stools are maroon or very bloody   Call your doctor now or seek immediate medical care if:    You have new or worse belly pain.     You have a fever.     You have new or more blood in your stools.     You are sick to your stomach and cannot drink fluids.     You cannot pass stools or gas.     You have pain that does not get better after you take pain medicine.   Watch closely for changes in your health, and be sure to contact your doctor if:    Your throat still hurts after a day or two.     You do not get better as expected.   Where can you learn more?  Go to https://www.[employer].net/patientEd and enter J014 to learn more about \"Esophageal Dilation: What to Expect at Home.\"  Current as of: March 21, 2023               Content Version: 13.9  © 6880-6581 [employer].   Care instructions adapted under license by [employer]. If you have questions about a medical condition or this instruction, always ask your healthcare professional. [employer] disclaims any warranty or liability for your use of this information.

## 2023-12-27 NOTE — OP NOTE
Operative Note      Patient: David Paz  YOB: 1950  MRN: [id number]    Date of Procedure: 12/27/2023    Pre-Op Diagnosis Codes:     * Esophageal stenosis [K22.2]  GERD  Dysphagia    Post-Op Diagnosis: Same and Minimal esophagitis, minimal stenosis, small hiatal hernia       Procedure(s):  EGD DILATION BALLOON AND BIOPSY    Surgeon(s):  Ernesto Deng MD    Assistant:   * No surgical staff found *    Anesthesia: Monitor Anesthesia Care    Estimated Blood Loss (mL): Minimal    Complications: None    Specimens:   ID Type Source Tests Collected by Time Destination   A : Gastric Antrum Tissue Stomach SURGICAL PATHOLOGY Ernesto Deng MD 12/27/2023 0818    B : Gastric Body Biopsy Tissue Stomach SURGICAL PATHOLOGY Ernesto Deng MD 12/27/2023 0819    C : Distal Esophagus Biopsy Tissue Esophagus SURGICAL PATHOLOGY Ernesto Deng MD 12/27/2023 0824        Implants:  * No implants in log *      Drains: * No LDAs found *    Patient was brought to the endoscopy area and IV sedation was induced by the CRNA.  Scope was put in patient's mouth on his esophagus into stomach and duodenum.  He has some tortuosity to his esophagus consistent with presbyesophagus.  He has minimal if any esophagitis and minimal stenosis if any.  He does have a small associated hiatal hernia.  Stomach grossly looked pretty normal as did the duodenum well down the third portion.  Scope was brought back and stomach and retroflexed and no evidence of any inflammation or problems were noted up in the fundus.  Confirms the presence of a small hiatal hernia.  For completeness biopsies were done of the antrum and body the stomach.  Because of his history of dysphagia and history of esophageal stenosis I did go ahead and do an esophageal dilation with balloon at this point.  Started with 18 mm balloon and worked her way up to the 20 mm balloon.  I do not think he got much dilation with even the largest of these.  The balloon seem  to slip to the GE junction with no resistance. The balloon was then removed and I did a few distal esophageal biopsies as well. Patient tolerated this well. We will see what the biopsy results show and make additional recommendations based upon those results. If he continues to have dysphagia it may be appropriate to evaluate his esophageal function.     Electronically signed by Yomi Iraheta MD on 12/27/2023 at 8:27 AM

## 2023-12-27 NOTE — ANESTHESIA POSTPROCEDURE EVALUATION
Department of Anesthesiology  Postprocedure Note    Patient: Kavya Glaser  MRN: [id number]  YOB: 1950  Date of evaluation: 12/27/2023    Procedure Summary       Date: 12/27/23 Room / Location: [address] [place] [address] ENDOSCOPY    Anesthesia Start: 0810 Anesthesia Stop: 3653    Procedure: EGD DILATION BALLOON AND BIOPSY Diagnosis:       Esophageal stenosis      (Esophageal stenosis [K22.2])    Surgeons: Jayme Hendrix MD Responsible Provider: MARIA DEL ROSARIO Chacon CRNA    Anesthesia Type: MAC ASA Status: 3            Anesthesia Type: No value filed. Clyde Phase I: Clyde Score: 10    Clyde Phase II:      Anesthesia Post Evaluation    Patient location during evaluation: PACU  Patient participation: complete - patient participated  Level of consciousness: awake and lethargic  Pain score: 0  Airway patency: patent  Nausea & Vomiting: no nausea and no vomiting  Cardiovascular status: hemodynamically stable  Respiratory status: acceptable, room air and spontaneous ventilation  Hydration status: euvolemic  Multimodal analgesia pain management approach  Pain management: adequate and satisfactory to patient    No notable events documented.

## 2024-01-02 LAB — SURGICAL PATHOLOGY REPORT: NORMAL

## 2024-01-03 ENCOUNTER — TELEPHONE (OUTPATIENT)
Dept: SURGERY | Age: 74
End: 2024-01-03

## 2024-01-03 NOTE — TELEPHONE ENCOUNTER
----- Message from Ernesto Deng MD sent at 1/2/2024 12:53 PM EST -----  Biopsies were normal.  There are other reasons he may have difficulty swallowing, and other test we can do to look into it if he would like.  Follow up in the office if he would like to discuss this more.

## 2024-01-16 RX ORDER — ATORVASTATIN CALCIUM 40 MG/1
TABLET, FILM COATED ORAL
Qty: 90 TABLET | Refills: 3 | Status: SHIPPED | OUTPATIENT
Start: 2024-01-16

## 2024-01-16 NOTE — TELEPHONE ENCOUNTER
Last OV 11/22/23 for AWV    Requesting refill on Atorvastatin thru Mychart.   Rx pending     Next OV Not scheduled

## 2024-01-22 RX ORDER — PANTOPRAZOLE SODIUM 40 MG/1
40 TABLET, DELAYED RELEASE ORAL DAILY
Qty: 180 TABLET | Refills: 1 | Status: SHIPPED | OUTPATIENT
Start: 2024-01-22

## 2024-01-22 NOTE — TELEPHONE ENCOUNTER
Patient asking if script [facility]. Patient is not signed up yet with [facility].

## 2024-01-26 PROBLEM — Z01.818 PRE-OP TESTING: Status: RESOLVED | Noted: 2023-12-27 | Resolved: 2024-01-26

## 2024-04-30 RX ORDER — ATORVASTATIN CALCIUM 40 MG/1
TABLET, FILM COATED ORAL
Qty: 90 TABLET | Refills: 3 | Status: SHIPPED | OUTPATIENT
Start: 2024-04-30

## 2024-04-30 NOTE — TELEPHONE ENCOUNTER
Last OV 11/22/23 AWV    Next OV not scheduled    Requesting refill on atorvastatin thru Mychart.  Rx pending

## 2024-05-11 DIAGNOSIS — E03.9 ACQUIRED HYPOTHYROIDISM: ICD-10-CM

## 2024-05-13 RX ORDER — LEVOTHYROXINE SODIUM 0.15 MG/1
150 TABLET ORAL
Qty: 90 TABLET | Refills: 1 | Status: SHIPPED | OUTPATIENT
Start: 2024-05-13

## 2024-08-06 ENCOUNTER — PATIENT MESSAGE (OUTPATIENT)
Dept: FAMILY MEDICINE CLINIC | Age: 74
End: 2024-08-06

## 2024-08-06 DIAGNOSIS — E03.9 ACQUIRED HYPOTHYROIDISM: ICD-10-CM

## 2024-08-06 RX ORDER — PANTOPRAZOLE SODIUM 40 MG/1
40 TABLET, DELAYED RELEASE ORAL DAILY
Qty: 180 TABLET | Refills: 1 | OUTPATIENT
Start: 2024-08-06

## 2024-08-06 RX ORDER — FINASTERIDE 5 MG/1
5 TABLET, FILM COATED ORAL DAILY
Qty: 90 TABLET | Refills: 3 | Status: SHIPPED | OUTPATIENT
Start: 2024-08-06

## 2024-08-06 RX ORDER — LEVOTHYROXINE SODIUM 0.15 MG/1
150 TABLET ORAL
Qty: 90 TABLET | Refills: 1 | Status: SHIPPED | OUTPATIENT
Start: 2024-08-06

## 2024-08-06 RX ORDER — ATORVASTATIN CALCIUM 40 MG/1
TABLET, FILM COATED ORAL
Qty: 90 TABLET | Refills: 3 | Status: SHIPPED | OUTPATIENT
Start: 2024-08-06

## 2024-08-06 RX ORDER — PANTOPRAZOLE SODIUM 40 MG/1
40 TABLET, DELAYED RELEASE ORAL DAILY
Qty: 90 TABLET | Refills: 3 | Status: SHIPPED | OUTPATIENT
Start: 2024-08-06

## 2024-08-06 NOTE — TELEPHONE ENCOUNTER
Last OV: 11/22/2023  AWV and chronic   Last RX:    Next scheduled apt: Visit date not found            Pt  requesting a refill

## 2024-08-06 NOTE — TELEPHONE ENCOUNTER
Last OV: 11/22/2023  reflux   Last RX:    Next scheduled apt: Visit date not found          Pt requesting a refill   Medication pending for approval

## 2024-08-06 NOTE — TELEPHONE ENCOUNTER
From: David Paz  To: Dr. Eileen Lantigua  Sent: 8/6/2024 2:39 PM EDT  Subject: medication    Can Dr Lantigua fill my protonix. Dr Deng's office did send it in    [facility]

## 2024-09-26 ENCOUNTER — HOSPITAL ENCOUNTER (OUTPATIENT)
Age: 74
Discharge: HOME | End: 2024-09-26
Payer: MEDICARE

## 2024-09-26 LAB
ANION GAP SERPL CALCULATED.3IONS-SCNC: 6 MMOL/L (ref 9–17)
BUN SERPL-MCNC: 18 MG/DL (ref 8–23)
BUN/CREAT SERPL: 16 (ref 9–20)
CALCIUM SERPL-MCNC: 8.7 MG/DL (ref 8.6–10.4)
CHLORIDE SERPL-SCNC: 106 MMOL/L (ref 98–107)
CO2 SERPL-SCNC: 28 MMOL/L (ref 20–31)
CREAT SERPL-MCNC: 1.1 MG/DL (ref 0.7–1.2)
ERYTHROCYTE [DISTWIDTH] IN BLOOD BY AUTOMATED COUNT: 13.4 % (ref 12.1–15.2)
GFR, ESTIMATED: 71 ML/MIN/1.73M2
GLUCOSE SERPL-MCNC: 97 MG/DL (ref 70–99)
HCT VFR BLD AUTO: 44.3 % (ref 41–53)
HGB BLD-MCNC: 15.1 G/DL (ref 13.5–17.5)
MCH RBC QN AUTO: 29.3 PG (ref 26–34)
MCHC RBC AUTO-ENTMCNC: 34.1 G/DL (ref 31–37)
MCV RBC AUTO: 86 FL (ref 80–100)
PLATELET # BLD AUTO: 230 K/UL (ref 140–450)
PMV BLD AUTO: 9.8 FL (ref 6–12)
POTASSIUM SERPL-SCNC: 4.4 MMOL/L (ref 3.7–5.3)
RBC # BLD AUTO: 5.15 M/UL (ref 4.5–5.9)
SODIUM SERPL-SCNC: 140 MMOL/L (ref 135–144)
WBC OTHER # BLD: 6.2 K/UL (ref 3.5–11)

## 2024-09-26 PROCEDURE — 93005 ELECTROCARDIOGRAM TRACING: CPT | Performed by: ORTHOPAEDIC SURGERY

## 2024-09-26 PROCEDURE — 36415 COLL VENOUS BLD VENIPUNCTURE: CPT

## 2024-09-26 PROCEDURE — 85027 COMPLETE CBC AUTOMATED: CPT

## 2024-09-26 PROCEDURE — 80048 BASIC METABOLIC PNL TOTAL CA: CPT

## 2024-09-27 LAB
EKG ATRIAL RATE: 47 BPM
EKG P AXIS: 39 DEGREES
EKG P-R INTERVAL: 180 MS
EKG Q-T INTERVAL: 434 MS
EKG QRS DURATION: 84 MS
EKG QTC CALCULATION (BAZETT): 384 MS
EKG R AXIS: -45 DEGREES
EKG T AXIS: 0 DEGREES
EKG VENTRICULAR RATE: 47 BPM

## 2024-10-28 NOTE — PROGRESS NOTES
Takes fluids well.  noc omplaints. Up with steady gait and voids qs. Discharge Criteria  Outpatients must meet criteria 1 through 7. Up to restroom, void sufficient amount. Yes    1. Minimum 30 minutes after last dose of sedative medication, minimum 120 minutes after last dose of reversal agent. Yes    2. Systolic BP stable within 20 mmHg for 30 minutes & systolic BP between 90 & 149 or within 10 mmHg of baseline. Yes    3. Pulse between 60 and 100 or within 10 bpm of baseline. Yes    4. Spontaneous respiratory rate >/= 10 per minute. Yes    5. SaO2 >/= 95 or  >/= baseline. Yes    6. Able to cough and swallow or return to baseline function. Yes    7. Alert and oriented or return to baseline mental status. Yes    8. Demonstrates controlled, coordinated movements, ambulates with steady gait, or return to baseline activity function. Yes    9. Minimal or no pain or nausea, or at a level tolerable and acceptable to patient. Yes    10. Takes and retains oral fluids as allowed. Yes    11. Procedural / perioperative site stable. Minimal or no bleeding. Yes        12. If GI endoscopy procedure, minimal or no abdominal distention or passing flatus. Yes    13. Written discharge instructions and emergency telephone number provided. Yes    14. Accompanied by a responsible adult. Yes    Adult patient discharged from facility without responsible person meets above criteria plus the following:   a) remains awake without stimulus for 30 minutes     b) oriented appropriate for age      c) all vital signs stable   d) no significant risk of losing protective reflexes      e) able to maintain pre-procedure mobility without assistance   f) no nausea or dizziness      g) transportation arrangements that do not require patient to operate motor   Vehicle.   Yes Alert-The patient is alert, awake and responds to voice. The patient is oriented to time, place, and person. The triage nurse is able to obtain subjective information.

## 2024-11-25 DIAGNOSIS — E03.9 ACQUIRED HYPOTHYROIDISM: ICD-10-CM

## 2024-11-25 RX ORDER — PANTOPRAZOLE SODIUM 40 MG/1
40 TABLET, DELAYED RELEASE ORAL DAILY
Qty: 90 TABLET | Refills: 3 | OUTPATIENT
Start: 2024-11-25

## 2024-11-25 RX ORDER — LEVOTHYROXINE SODIUM 150 UG/1
150 TABLET ORAL
Qty: 90 TABLET | Refills: 1 | OUTPATIENT
Start: 2024-11-25

## 2024-12-13 ENCOUNTER — TELEPHONE (OUTPATIENT)
Dept: FAMILY MEDICINE CLINIC | Age: 74
End: 2024-12-13

## 2024-12-13 DIAGNOSIS — E78.00 PURE HYPERCHOLESTEROLEMIA: ICD-10-CM

## 2024-12-13 DIAGNOSIS — R73.01 IFG (IMPAIRED FASTING GLUCOSE): ICD-10-CM

## 2024-12-13 DIAGNOSIS — Z12.5 PROSTATE CANCER SCREENING: Primary | ICD-10-CM

## 2024-12-13 DIAGNOSIS — E03.9 ACQUIRED HYPOTHYROIDISM: ICD-10-CM

## 2024-12-13 DIAGNOSIS — K21.9 GASTROESOPHAGEAL REFLUX DISEASE WITHOUT ESOPHAGITIS: ICD-10-CM

## 2024-12-17 SDOH — ECONOMIC STABILITY: FOOD INSECURITY: WITHIN THE PAST 12 MONTHS, THE FOOD YOU BOUGHT JUST DIDN'T LAST AND YOU DIDN'T HAVE MONEY TO GET MORE.: NEVER TRUE

## 2024-12-17 SDOH — ECONOMIC STABILITY: FOOD INSECURITY: WITHIN THE PAST 12 MONTHS, YOU WORRIED THAT YOUR FOOD WOULD RUN OUT BEFORE YOU GOT MONEY TO BUY MORE.: NEVER TRUE

## 2024-12-17 SDOH — ECONOMIC STABILITY: INCOME INSECURITY: HOW HARD IS IT FOR YOU TO PAY FOR THE VERY BASICS LIKE FOOD, HOUSING, MEDICAL CARE, AND HEATING?: NOT HARD AT ALL

## 2024-12-17 SDOH — HEALTH STABILITY: PHYSICAL HEALTH: ON AVERAGE, HOW MANY MINUTES DO YOU ENGAGE IN EXERCISE AT THIS LEVEL?: 10 MIN

## 2024-12-17 SDOH — HEALTH STABILITY: PHYSICAL HEALTH: ON AVERAGE, HOW MANY DAYS PER WEEK DO YOU ENGAGE IN MODERATE TO STRENUOUS EXERCISE (LIKE A BRISK WALK)?: 2 DAYS

## 2024-12-17 ASSESSMENT — PATIENT HEALTH QUESTIONNAIRE - PHQ9
SUM OF ALL RESPONSES TO PHQ QUESTIONS 1-9: 0
1. LITTLE INTEREST OR PLEASURE IN DOING THINGS: NOT AT ALL
2. FEELING DOWN, DEPRESSED OR HOPELESS: NOT AT ALL
SUM OF ALL RESPONSES TO PHQ9 QUESTIONS 1 & 2: 0

## 2024-12-17 ASSESSMENT — LIFESTYLE VARIABLES
HOW OFTEN DO YOU HAVE SIX OR MORE DRINKS ON ONE OCCASION: 1
HOW MANY STANDARD DRINKS CONTAINING ALCOHOL DO YOU HAVE ON A TYPICAL DAY: 1
HOW OFTEN DO YOU HAVE A DRINK CONTAINING ALCOHOL: 3
HOW OFTEN DO YOU HAVE A DRINK CONTAINING ALCOHOL: 2-4 TIMES A MONTH
HOW MANY STANDARD DRINKS CONTAINING ALCOHOL DO YOU HAVE ON A TYPICAL DAY: 1 OR 2

## 2024-12-19 ENCOUNTER — HOSPITAL ENCOUNTER (OUTPATIENT)
Age: 74
Discharge: HOME | End: 2024-12-19
Payer: MEDICARE

## 2024-12-19 DIAGNOSIS — Z12.5 PROSTATE CANCER SCREENING: ICD-10-CM

## 2024-12-19 DIAGNOSIS — E78.00 PURE HYPERCHOLESTEROLEMIA: ICD-10-CM

## 2024-12-19 DIAGNOSIS — R73.01 IFG (IMPAIRED FASTING GLUCOSE): ICD-10-CM

## 2024-12-19 DIAGNOSIS — E03.9 ACQUIRED HYPOTHYROIDISM: ICD-10-CM

## 2024-12-19 DIAGNOSIS — K21.9 GASTROESOPHAGEAL REFLUX DISEASE WITHOUT ESOPHAGITIS: ICD-10-CM

## 2024-12-19 LAB
ALBUMIN SERPL-MCNC: 4.1 G/DL (ref 3.5–5.2)
ALP SERPL-CCNC: 94 U/L (ref 40–129)
ALT SERPL-CCNC: 24 U/L (ref 5–41)
ANION GAP SERPL CALCULATED.3IONS-SCNC: 11 MMOL/L (ref 9–17)
AST SERPL-CCNC: 23 U/L
BASOPHILS # BLD: 0.02 K/UL (ref 0–0.2)
BASOPHILS NFR BLD: 0 % (ref 0–2)
BILIRUB SERPL-MCNC: 1.7 MG/DL (ref 0.3–1.2)
BUN SERPL-MCNC: 13 MG/DL (ref 8–23)
BUN/CREAT SERPL: 12 (ref 9–20)
CALCIUM SERPL-MCNC: 9.1 MG/DL (ref 8.6–10.4)
CHLORIDE SERPL-SCNC: 100 MMOL/L (ref 98–107)
CHOLEST SERPL-MCNC: 145 MG/DL (ref 0–199)
CHOLESTEROL/HDL RATIO: 1.9
CO2 SERPL-SCNC: 27 MMOL/L (ref 20–31)
CREAT SERPL-MCNC: 1.1 MG/DL (ref 0.7–1.2)
EOSINOPHIL # BLD: 0.21 K/UL (ref 0–0.4)
EOSINOPHILS RELATIVE PERCENT: 3 % (ref 0–5)
ERYTHROCYTE [DISTWIDTH] IN BLOOD BY AUTOMATED COUNT: 12.9 % (ref 12.1–15.2)
EST. AVERAGE GLUCOSE BLD GHB EST-MCNC: 108 MG/DL
GFR, ESTIMATED: 71 ML/MIN/1.73M2
GLUCOSE SERPL-MCNC: 101 MG/DL (ref 70–99)
HBA1C MFR BLD: 5.4 % (ref 4–6)
HCT VFR BLD AUTO: 45 % (ref 41–53)
HDLC SERPL-MCNC: 76 MG/DL
HGB BLD-MCNC: 15.7 G/DL (ref 13.5–17.5)
IMM GRANULOCYTES # BLD AUTO: 0.01 K/UL (ref 0–0.3)
IMM GRANULOCYTES NFR BLD: 0 % (ref 0–5)
LDLC SERPL CALC-MCNC: 54 MG/DL (ref 0–100)
LYMPHOCYTES NFR BLD: 2.99 K/UL (ref 1–4.8)
LYMPHOCYTES RELATIVE PERCENT: 43 % (ref 13–44)
MCH RBC QN AUTO: 30 PG (ref 26–34)
MCHC RBC AUTO-ENTMCNC: 34.9 G/DL (ref 31–37)
MCV RBC AUTO: 86 FL (ref 80–100)
MONOCYTES NFR BLD: 0.6 K/UL (ref 0–1)
MONOCYTES NFR BLD: 9 % (ref 5–9)
NEUTROPHILS NFR BLD: 45 % (ref 39–75)
NEUTS SEG NFR BLD: 3.2 K/UL (ref 2.1–6.5)
PLATELET # BLD AUTO: 244 K/UL (ref 140–450)
PMV BLD AUTO: 9.5 FL (ref 6–12)
POTASSIUM SERPL-SCNC: 4.3 MMOL/L (ref 3.7–5.3)
PROT SERPL-MCNC: 6.5 G/DL (ref 6.4–8.3)
PSA SERPL-MCNC: 2.65 NG/ML (ref 0–4)
RBC # BLD AUTO: 5.23 M/UL (ref 4.5–5.9)
SODIUM SERPL-SCNC: 138 MMOL/L (ref 135–144)
T4 FREE SERPL-MCNC: 1.1 NG/DL (ref 0.92–1.68)
TRIGL SERPL-MCNC: 75 MG/DL
TSH SERPL DL<=0.05 MIU/L-ACNC: 26.66 UIU/ML (ref 0.3–5)
VLDLC SERPL CALC-MCNC: 15 MG/DL (ref 1–30)
WBC OTHER # BLD: 7 K/UL (ref 3.5–11)

## 2024-12-19 PROCEDURE — 80061 LIPID PANEL: CPT

## 2024-12-19 PROCEDURE — 84443 ASSAY THYROID STIM HORMONE: CPT

## 2024-12-19 PROCEDURE — 83036 HEMOGLOBIN GLYCOSYLATED A1C: CPT

## 2024-12-19 PROCEDURE — 80053 COMPREHEN METABOLIC PANEL: CPT

## 2024-12-19 PROCEDURE — 85025 COMPLETE CBC W/AUTO DIFF WBC: CPT

## 2024-12-19 PROCEDURE — 36415 COLL VENOUS BLD VENIPUNCTURE: CPT

## 2024-12-19 PROCEDURE — 84439 ASSAY OF FREE THYROXINE: CPT

## 2024-12-19 PROCEDURE — G0103 PSA SCREENING: HCPCS

## 2024-12-20 ENCOUNTER — OFFICE VISIT (OUTPATIENT)
Dept: FAMILY MEDICINE CLINIC | Age: 74
End: 2024-12-20

## 2024-12-20 VITALS
WEIGHT: 194 LBS | SYSTOLIC BLOOD PRESSURE: 128 MMHG | DIASTOLIC BLOOD PRESSURE: 70 MMHG | HEIGHT: 69 IN | BODY MASS INDEX: 28.73 KG/M2 | HEART RATE: 68 BPM | OXYGEN SATURATION: 97 %

## 2024-12-20 DIAGNOSIS — E80.4 GILBERT'S SYNDROME: ICD-10-CM

## 2024-12-20 DIAGNOSIS — E78.00 PURE HYPERCHOLESTEROLEMIA: ICD-10-CM

## 2024-12-20 DIAGNOSIS — N40.1 BENIGN PROSTATIC HYPERPLASIA WITH URINARY FREQUENCY: ICD-10-CM

## 2024-12-20 DIAGNOSIS — Z00.00 MEDICARE ANNUAL WELLNESS VISIT, SUBSEQUENT: Primary | ICD-10-CM

## 2024-12-20 DIAGNOSIS — K21.9 GASTROESOPHAGEAL REFLUX DISEASE WITHOUT ESOPHAGITIS: ICD-10-CM

## 2024-12-20 DIAGNOSIS — E03.9 ACQUIRED HYPOTHYROIDISM: ICD-10-CM

## 2024-12-20 DIAGNOSIS — Z23 NEED FOR INFLUENZA VACCINATION: ICD-10-CM

## 2024-12-20 DIAGNOSIS — M54.16 CHRONIC LUMBAR RADICULOPATHY: ICD-10-CM

## 2024-12-20 DIAGNOSIS — R35.0 BENIGN PROSTATIC HYPERPLASIA WITH URINARY FREQUENCY: ICD-10-CM

## 2024-12-20 NOTE — PROGRESS NOTES
Name: David Paz  : 1950         Chief Complaint:     Chief Complaint   Patient presents with    Medicare AWV    Hypothyroidism    Hyperlipidemia       History of Present Illness:      David Paz is a 74 y.o.  male who presents with Medicare AWV, Hypothyroidism, and Hyperlipidemia      HPI    Recovering from CTS surgery about a month ago, some minor complications with arthritis in hands.     Flare of low back pain and sciatica recently r/t falling (didn't land on floor) from ladder, came down hard on R foot and hit his back on something in garage. Pain goes straight down posterior LLE. Does some stretching for back and uses inversion table, does situps on that. Still flares with turning the wrong way. Heating pad at night, sometimes ice if really bothering, TENS unit. Very rare aleve just for R hand or very occasionally for back - h/o PUD.     Morning takes MTV, fish oil, protonix, synthroid, fairly frequently misses them. Had just started MTV about 2 mos ago.     High bili, h/o other elevated liver enzymes, cut way back on drinking at that time. Now has at most 2 drinks per week, otherwise NA beers.     Medical History:     Patient Active Problem List   Diagnosis    Hypothyroidism    Pure hypercholesterolemia    Arthritis    Tendinitis of right wrist    PUD (peptic ulcer disease)    Skin tag of perianal region    Scalp psoriasis    GERD with stricture    Esophageal dysphagia    Esophageal stenosis       Medications:       Prior to Admission medications    Medication Sig Start Date End Date Taking? Authorizing Provider   finasteride (PROSCAR) 5 MG tablet Take 1 tablet by mouth daily 24  Yes Eileen Lantigua DO   atorvastatin (LIPITOR) 40 MG tablet Take 1 tablet by mouth daily 24  Yes Eileen Lantigua DO   levothyroxine (SYNTHROID) 150 MCG tablet Take 1 tablet by mouth every morning (before breakfast) 24  Yes Eileen Lantigua DO   pantoprazole (PROTONIX) 40 MG tablet Take 1

## 2024-12-27 NOTE — PROGRESS NOTES
Medicare Annual Wellness Visit    David Paz is here for Medicare AWV, Hypothyroidism, and Hyperlipidemia    Assessment & Plan   Medicare annual wellness visit, subsequent  Acquired hypothyroidism  -     TSH reflex to FT4; Future  Chronic lumbar radiculopathy  Gilbert's syndrome  Pure hypercholesterolemia  Gastroesophageal reflux disease without esophagitis  Benign prostatic hyperplasia with urinary frequency  Need for influenza vaccination  -     Influenza, FLUAD Trivalent, (age 65 y+), IM, Preservative Free, 0.5mL    Recommendations for Preventive Services Due: see orders and patient instructions/AVS.  Recommended screening schedule for the next 5-10 years is provided to the patient in written form: see Patient Instructions/AVS.     Return in about 1 year (around 12/20/2025) for AWV.     Subjective       Patient's complete Health Risk Assessment and screening values have been reviewed and are found in Flowsheets. The following problems were reviewed today and where indicated follow up appointments were made and/or referrals ordered.    Positive Risk Factor Screenings with Interventions:    Fall Risk:  Do you feel unsteady or are you worried about falling? : no  2 or more falls in past year?: no  Fall with injury in past year?: (!) yes     Interventions:    Reviewed medications, home hazards, visual acuity, and co-morbidities that can increase risk for falls             Inactivity:  On average, how many days per week do you engage in moderate to strenuous exercise (like a brisk walk)?: 2 days (!) Abnormal  On average, how many minutes do you engage in exercise at this level?: 10 min  Interventions:  Patient declined any further interventions or treatment    Poor Eating Habits/Diet:  Do you eat balanced/healthy meals regularly?: (!) No  Interventions:  Patient declines any further evaluation or treatment      Hearing Screen:  Do you or your family notice any trouble with your hearing that hasn't been managed

## 2025-01-30 ENCOUNTER — HOSPITAL ENCOUNTER (OUTPATIENT)
Age: 75
Discharge: HOME | End: 2025-01-30
Payer: MEDICARE

## 2025-01-30 ENCOUNTER — TELEPHONE (OUTPATIENT)
Dept: FAMILY MEDICINE CLINIC | Age: 75
End: 2025-01-30

## 2025-01-30 DIAGNOSIS — E03.9 ACQUIRED HYPOTHYROIDISM: ICD-10-CM

## 2025-01-30 DIAGNOSIS — Z80.8 FAMILY HISTORY OF BRAIN CANCER: Primary | ICD-10-CM

## 2025-01-30 DIAGNOSIS — Z80.8 FAMILY HISTORY OF BRAIN CANCER: ICD-10-CM

## 2025-01-30 LAB
PROLACTIN SERPL-MCNC: 23.5 NG/ML (ref 4.04–15.2)
T4 FREE SERPL-MCNC: 1.4 NG/DL (ref 0.9–1.7)
TSH SERPL DL<=0.05 MIU/L-ACNC: 9.32 UIU/ML (ref 0.3–5)

## 2025-01-30 PROCEDURE — 84439 ASSAY OF FREE THYROXINE: CPT

## 2025-01-30 PROCEDURE — 84146 ASSAY OF PROLACTIN: CPT

## 2025-01-30 PROCEDURE — 84443 ASSAY THYROID STIM HORMONE: CPT

## 2025-01-30 PROCEDURE — 36415 COLL VENOUS BLD VENIPUNCTURE: CPT

## 2025-01-31 ENCOUNTER — TELEPHONE (OUTPATIENT)
Dept: FAMILY MEDICINE CLINIC | Age: 75
End: 2025-01-31

## 2025-01-31 DIAGNOSIS — E03.9 ACQUIRED HYPOTHYROIDISM: ICD-10-CM

## 2025-01-31 DIAGNOSIS — Z80.8 FAMILY HISTORY OF BRAIN CANCER: Primary | ICD-10-CM

## 2025-01-31 DIAGNOSIS — R79.89 ELEVATED PROLACTIN LEVEL: ICD-10-CM

## 2025-01-31 RX ORDER — LEVOTHYROXINE SODIUM 175 UG/1
175 TABLET ORAL
Qty: 42 TABLET | Refills: 0 | Status: SHIPPED | OUTPATIENT
Start: 2025-01-31

## 2025-01-31 NOTE — TELEPHONE ENCOUNTER
----- Message from Dr. Eileen Lantigua, DO sent at 1/31/2025  6:25 AM EST -----  Thyroid improved but still uncontrolled so I recommend increasing Synthroid to 175 mcg daily. Prolactin level was elevated also - not hugely elevated but with the family history I do recommend brain MRI. Would also like to know any other info he has about the brain cancer that his family members have had.

## 2025-01-31 NOTE — TELEPHONE ENCOUNTER
Notified, med increase pending, repeat tsh for 6 weeks pending.  Ok to order MRI,  He will ask his sister for more family history

## 2025-02-15 DIAGNOSIS — E03.9 ACQUIRED HYPOTHYROIDISM: ICD-10-CM

## 2025-02-17 RX ORDER — LEVOTHYROXINE SODIUM 150 UG/1
150 TABLET ORAL
Qty: 90 TABLET | Refills: 1 | OUTPATIENT
Start: 2025-02-17

## 2025-02-17 NOTE — TELEPHONE ENCOUNTER
Last visit:  12/20/2024  Next Visit Date:  No future appointments.      Medication List:  Prior to Admission medications    Medication Sig Start Date End Date Taking? Authorizing Provider   levothyroxine (SYNTHROID) 175 MCG tablet Take 1 tablet by mouth every morning (before breakfast) 1/31/25   Eileen Lantigua DO   finasteride (PROSCAR) 5 MG tablet Take 1 tablet by mouth daily 8/6/24   Eileen Lantigua DO   atorvastatin (LIPITOR) 40 MG tablet Take 1 tablet by mouth daily 8/6/24   Eileen Lantigua DO   pantoprazole (PROTONIX) 40 MG tablet Take 1 tablet by mouth daily 8/6/24   Eileen Lantigua DO   Omega-3 Fatty Acids (FISH OIL) 1000 MG capsule     Leydi Mandel MD   Magnesium 500 MG CAPS Take by mouth    Leydi Mandel MD   Nerve Stimulator (EMJOI TENS) JOSI Use as directed.  Include 2 sets of pads, with 3 refills  Indications: Lower Backache 11/28/11   Ellis Dupree MD

## 2025-02-26 ENCOUNTER — HOSPITAL ENCOUNTER (OUTPATIENT)
Dept: MRI IMAGING | Age: 75
Discharge: HOME | End: 2025-02-28
Attending: FAMILY MEDICINE
Payer: MEDICARE

## 2025-02-26 DIAGNOSIS — Z80.8 FAMILY HISTORY OF BRAIN CANCER: Primary | ICD-10-CM

## 2025-02-26 DIAGNOSIS — R79.89 ELEVATED PROLACTIN LEVEL: ICD-10-CM

## 2025-02-26 DIAGNOSIS — Z80.8 FAMILY HISTORY OF BRAIN CANCER: ICD-10-CM

## 2025-02-26 LAB
BUN SERPL-MCNC: 18 MG/DL (ref 8–23)
CREAT SERPL-MCNC: 1.3 MG/DL (ref 0.7–1.2)
GFR, ESTIMATED: 58 ML/MIN/1.73M2

## 2025-02-26 PROCEDURE — 6360000004 HC RX CONTRAST MEDICATION: Performed by: FAMILY MEDICINE

## 2025-02-26 PROCEDURE — 70553 MRI BRAIN STEM W/O & W/DYE: CPT

## 2025-02-26 PROCEDURE — 82565 ASSAY OF CREATININE: CPT

## 2025-02-26 PROCEDURE — 84520 ASSAY OF UREA NITROGEN: CPT

## 2025-02-26 PROCEDURE — A9577 INJ MULTIHANCE: HCPCS | Performed by: FAMILY MEDICINE

## 2025-02-26 PROCEDURE — 36415 COLL VENOUS BLD VENIPUNCTURE: CPT

## 2025-02-26 RX ADMIN — GADOBENATE DIMEGLUMINE 9 ML: 529 INJECTION, SOLUTION INTRAVENOUS at 10:06

## 2025-02-27 ENCOUNTER — TELEPHONE (OUTPATIENT)
Dept: FAMILY MEDICINE CLINIC | Age: 75
End: 2025-02-27

## 2025-02-27 DIAGNOSIS — E22.9 PITUITARY MICROADENOMA WITH HYPERPROLACTINEMIA (HCC): Primary | ICD-10-CM

## 2025-02-27 DIAGNOSIS — E03.9 ACQUIRED HYPOTHYROIDISM: ICD-10-CM

## 2025-02-27 DIAGNOSIS — D35.2 PITUITARY MICROADENOMA WITH HYPERPROLACTINEMIA (HCC): Primary | ICD-10-CM

## 2025-02-27 DIAGNOSIS — R79.89 ELEVATED TSH: ICD-10-CM

## 2025-02-27 NOTE — TELEPHONE ENCOUNTER
----- Message from Dr. Eileen Lantigua, DO sent at 2/27/2025  7:51 AM EST -----  Does have what appears to be a pituitary adenoma which would be benign. However, they can affect hormones (prolactin, may also be why tsh has been elevated) so I recommend seeing endo.

## 2025-03-02 SDOH — ECONOMIC STABILITY: FOOD INSECURITY: WITHIN THE PAST 12 MONTHS, THE FOOD YOU BOUGHT JUST DIDN'T LAST AND YOU DIDN'T HAVE MONEY TO GET MORE.: NEVER TRUE

## 2025-03-02 SDOH — ECONOMIC STABILITY: INCOME INSECURITY: IN THE LAST 12 MONTHS, WAS THERE A TIME WHEN YOU WERE NOT ABLE TO PAY THE MORTGAGE OR RENT ON TIME?: NO

## 2025-03-02 SDOH — ECONOMIC STABILITY: FOOD INSECURITY: WITHIN THE PAST 12 MONTHS, YOU WORRIED THAT YOUR FOOD WOULD RUN OUT BEFORE YOU GOT MONEY TO BUY MORE.: NEVER TRUE

## 2025-03-02 SDOH — ECONOMIC STABILITY: TRANSPORTATION INSECURITY
IN THE PAST 12 MONTHS, HAS THE LACK OF TRANSPORTATION KEPT YOU FROM MEDICAL APPOINTMENTS OR FROM GETTING MEDICATIONS?: NO

## 2025-03-02 ASSESSMENT — PATIENT HEALTH QUESTIONNAIRE - PHQ9
1. LITTLE INTEREST OR PLEASURE IN DOING THINGS: NOT AT ALL
SUM OF ALL RESPONSES TO PHQ QUESTIONS 1-9: 0
SUM OF ALL RESPONSES TO PHQ9 QUESTIONS 1 & 2: 0
SUM OF ALL RESPONSES TO PHQ QUESTIONS 1-9: 0
2. FEELING DOWN, DEPRESSED OR HOPELESS: NOT AT ALL
1. LITTLE INTEREST OR PLEASURE IN DOING THINGS: NOT AT ALL
2. FEELING DOWN, DEPRESSED OR HOPELESS: NOT AT ALL

## 2025-03-05 ENCOUNTER — OFFICE VISIT (OUTPATIENT)
Dept: FAMILY MEDICINE CLINIC | Age: 75
End: 2025-03-05
Payer: MEDICARE

## 2025-03-05 VITALS
HEART RATE: 64 BPM | DIASTOLIC BLOOD PRESSURE: 72 MMHG | OXYGEN SATURATION: 94 % | SYSTOLIC BLOOD PRESSURE: 122 MMHG | WEIGHT: 202 LBS | HEIGHT: 69 IN | BODY MASS INDEX: 29.92 KG/M2

## 2025-03-05 DIAGNOSIS — D35.2 PITUITARY MICROADENOMA WITH HYPERPROLACTINEMIA (HCC): ICD-10-CM

## 2025-03-05 DIAGNOSIS — M53.3 PAIN OF LEFT SACROILIAC JOINT: Primary | ICD-10-CM

## 2025-03-05 DIAGNOSIS — E22.9 PITUITARY MICROADENOMA WITH HYPERPROLACTINEMIA (HCC): ICD-10-CM

## 2025-03-05 PROCEDURE — 99213 OFFICE O/P EST LOW 20 MIN: CPT | Performed by: FAMILY MEDICINE

## 2025-03-05 PROCEDURE — 1036F TOBACCO NON-USER: CPT | Performed by: FAMILY MEDICINE

## 2025-03-05 PROCEDURE — 3017F COLORECTAL CA SCREEN DOC REV: CPT | Performed by: FAMILY MEDICINE

## 2025-03-05 PROCEDURE — 1123F ACP DISCUSS/DSCN MKR DOCD: CPT | Performed by: FAMILY MEDICINE

## 2025-03-05 PROCEDURE — 1159F MED LIST DOCD IN RCRD: CPT | Performed by: FAMILY MEDICINE

## 2025-03-05 PROCEDURE — G8427 DOCREV CUR MEDS BY ELIG CLIN: HCPCS | Performed by: FAMILY MEDICINE

## 2025-03-05 PROCEDURE — G8419 CALC BMI OUT NRM PARAM NOF/U: HCPCS | Performed by: FAMILY MEDICINE

## 2025-03-05 RX ORDER — CALCIUM CARBONATE/VITAMIN D3 600 MG-10
1 TABLET ORAL DAILY
COMMUNITY

## 2025-03-05 RX ORDER — CYCLOSPORINE 0.5 MG/ML
EMULSION OPHTHALMIC
COMMUNITY
Start: 2025-03-03

## 2025-03-05 RX ORDER — CALCIUM CARBONATE 300MG(750)
TABLET,CHEWABLE ORAL
COMMUNITY

## 2025-03-05 RX ORDER — CLINDAMYCIN HYDROCHLORIDE 150 MG/1
150 CAPSULE ORAL 3 TIMES DAILY
COMMUNITY
Start: 2025-02-04 | End: 2025-03-05

## 2025-03-05 SDOH — ECONOMIC STABILITY: FOOD INSECURITY: WITHIN THE PAST 12 MONTHS, THE FOOD YOU BOUGHT JUST DIDN'T LAST AND YOU DIDN'T HAVE MONEY TO GET MORE.: NEVER TRUE

## 2025-03-05 SDOH — ECONOMIC STABILITY: FOOD INSECURITY: WITHIN THE PAST 12 MONTHS, YOU WORRIED THAT YOUR FOOD WOULD RUN OUT BEFORE YOU GOT MONEY TO BUY MORE.: NEVER TRUE

## 2025-03-05 NOTE — PROGRESS NOTES
08:41 AM    GLUCOSE 101 12/19/2024 10:46 AM    GLUCOSE 94 11/09/2011 08:24 AM    BILITOT 1.7 12/19/2024 10:46 AM    ALKPHOS 94 12/19/2024 10:46 AM    AST 23 12/19/2024 10:46 AM    ALT 24 12/19/2024 10:46 AM     Lab Results   Component Value Date/Time    WBC 7.0 12/19/2024 10:46 AM    RBC 5.23 12/19/2024 10:46 AM    RBC 4.91 11/09/2011 08:24 AM    HGB 15.7 12/19/2024 10:46 AM    HCT 45.0 12/19/2024 10:46 AM    MCV 86.0 12/19/2024 10:46 AM    MCH 30.0 12/19/2024 10:46 AM    MCHC 34.9 12/19/2024 10:46 AM    RDW 12.9 12/19/2024 10:46 AM     12/19/2024 10:46 AM     11/09/2011 08:24 AM    MPV 9.5 12/19/2024 10:46 AM     Lab Results   Component Value Date/Time    TSH 9.32 01/30/2025 07:23 AM     Lab Results   Component Value Date/Time    CHOL 145 12/19/2024 10:46 AM    LDL 54 12/19/2024 10:46 AM    LDL 64 11/02/2017 12:00 AM    HDL 76 12/19/2024 10:46 AM    PSA 2.65 12/19/2024 10:46 AM    LABA1C 5.4 12/19/2024 10:46 AM         Assessment & Plan:        Diagnosis Orders   1. Pain of left sacroiliac joint  [facility]  [place]      2. Pituitary microadenoma with hyperprolactinemia (HCC)          1.  Suspect patient injured or inflamed left SI with fall in December.  Has continued to have pain since then.  Has been doing home exercises, NSAIDs, heat, etc., without adequate relief.  Referred to PT.  If not improving may need pain management referral, imaging.  2.  As above, we had ordered prolactin level as recommended screening because of his family history.  It was mildly elevated so then we ordered MRI which did show microadenoma.  He has been referred to endocrinology and sounds like he will be able to have a visit early next week.      signed by Eileen Lantigua DO on 3/5/2025 at 9:21 AM  [facility]  [address] [address]  Dept: [phone]

## 2025-03-05 NOTE — PATIENT INSTRUCTIONS
[employer] SURVEY:    You may be receiving a survey from [employer] regarding your visit today.    You may get this in the mail, through your MyChart or in your email.     Please complete the survey to enable us to provide the highest quality of care to you and your family.    If you cannot score us as very good ( 5 Stars) on any question, please feel free to call the office to discuss how we could have made your experience exceptional.     Thank you.    Clinical Care Team:   DO Michael Mejias CMA                                     Triage: Emilia Borrero CMA              Clerical Team:    Emilia Reyes     [place] [phone]           Billing questions: [phone]           Medical Records Request: [phone]

## 2025-03-13 ENCOUNTER — TELEPHONE (OUTPATIENT)
Dept: FAMILY MEDICINE CLINIC | Age: 75
End: 2025-03-13

## 2025-03-13 DIAGNOSIS — E03.9 ACQUIRED HYPOTHYROIDISM: ICD-10-CM

## 2025-03-13 RX ORDER — LEVOTHYROXINE SODIUM 175 UG/1
175 TABLET ORAL
Qty: 30 TABLET | Refills: 0 | Status: SHIPPED | OUTPATIENT
Start: 2025-03-13

## 2025-03-13 NOTE — TELEPHONE ENCOUNTER
Rx sent - please let pt know I did a short supply again in case endocrinology wants to change dose.

## 2025-03-13 NOTE — TELEPHONE ENCOUNTER
Rx refill request via [facility]  Levothyroxine 175mcg qd  Last OV 12-20-24 for AWV  Next appt- none

## 2025-03-17 ENCOUNTER — HOSPITAL ENCOUNTER (OUTPATIENT)
Dept: PHYSICAL THERAPY | Age: 75
Setting detail: THERAPIES SERIES
Discharge: HOME | End: 2025-03-17
Attending: FAMILY MEDICINE
Payer: MEDICARE

## 2025-03-17 PROCEDURE — 97161 PT EVAL LOW COMPLEX 20 MIN: CPT

## 2025-03-17 PROCEDURE — 97140 MANUAL THERAPY 1/> REGIONS: CPT

## 2025-03-17 ASSESSMENT — PAIN SCALES - GENERAL: PAINLEVEL_OUTOF10: 4

## 2025-03-17 NOTE — PLAN OF CARE
[facility]       Phone: [phone]   Date: 3/17/2025                      Outpatient Physical Therapy  Fax: [phone]    ACCT #: [id number]                     Plan of Care  [facility]#: [id number]  Patient: David Paz  : 1950    Referring Provider (secondary): Rhina    Diagnosis: Pain of left sacroiliac joint  Onset Date: 24  Treatment Diagnosis: Left SI dysfunction    Assessment  Body Structures, Functions, Activity Limitations Requiring Skilled Therapeutic Intervention: Decreased functional mobility , Decreased ADL status, Decreased ROM, Decreased body mechanics, Decreased tolerance to work activity, Decreased strength, Decreased posture, Increased pain  Assessment: Patient presents with c/o left lower back and leg pain s/p fall from ladder x2.  Also notes fall onto left side from stairs.  Subjective points to likely upslip on left.  Confirmed with SI testing.  Excellent response to MET with reduction of pain and improved mobility.  Will continue to treat with manual techniques, hip and trunk stretching and strengthening.  Therapy Prognosis: Excellent    Treatment Plan   Days: 2 times per week Weeks: 4 weeks Total # of Visits Approved: 8    Patient Education/HEP, Back Education, Therapeutic Exercise, Manual Therapy: Myofacial Release/Cupping, Manual Therapy: Mobilization/Manipulation, Dry Needling, HP/CP, Electrical Stimulation, and Therapeutic Activity     Goals  Short Term Goals  Time Frame for Short Term Goals: 4 visits  Short Term Goal 1: Patient will demonstrate compliance with HEP to optimize therapy progress  Long Term Goals  Time Frame for Long Term Goals : 8 visits  Long Term Goal 1: Patient will improve lumbar AROM to nil limitation in all planes  Long Term Goal 2: Patient will improve bilateralhip MMT to 5/5 in all planes to improve functional ability  Long Term Goal 3: Patient will improve Oswestry score from 22/50 to 10/50 to demonstrate functional

## 2025-03-17 NOTE — THERAPY EVALUATION
Phone: [phone]                       [facility]         Fax: [phone]                      Outpatient Physical Therapy                                                                      Evaluation    Date: 3/17/2025  Patient: David Paz  : 1950  ACCT #: [id number]    Referring Provider (secondary): Rhina    Diagnosis: Pain of left sacroiliac joint    Treatment Diagnosis: Left SI dysfunction  Onset Date: 24  PT Insurance Information: Medicare  Total # of Visits Approved: 8 Per Physician Order  Total # of Visits to Date: 1     Subjective     Additional Pertinent Hx: Patient reports falling from ladder twice, \"I jammed my left leg and went backward into tool box.\"  Better with some stretching and heat.  Demonstrates hip extension, and trunk flexion.  He did not go to ER.  No imaging.  Has difficulty sleeping and getting up in the mornings.  Occasionally uses a cane for ambulation.  Last few days have been better.  He is not taking medications, even OTC, for pain.  He has not tried chiropractic or massage.  Minor pain with cough/sneeze.  More painful with prolonged sitting and driving.  Does OK with standing, but painful when he rotates his back.  Pain has gone to left calf.  Lately is only posterior glute and half way to knee.  Notes some T/N in same distrobution.  He is unable to cut wood at this point, but remains fairly active.  PMHx: HLD, Hypothyroidism, OA, GERD, migraines, gastric ulcers, right wrist pain  Pain Assessment  Pain Level: 4          Objective        Spine  Lumbar: Nil limitation all motions except left SB  Strength RLE  R Hip Flexion: 4+/5  R Hip Extension: 4/5  R Hip ABduction: 5/5  R Hip ADduction: 5/5  R Hip Internal Rotation: 4+/5  R Hip External Rotation: 4+/5  R Knee Flexion: 5/5  R Knee Extension: 5/5  R Ankle Dorsiflexion: 5/5  R Ankle Plantar flexion: 5/5  Strength LLE  L Hip Flexion: 4+/5  L Hip Extension: 4/5  L Hip ABduction: 5/5  L Hip

## 2025-03-20 ENCOUNTER — HOSPITAL ENCOUNTER (OUTPATIENT)
Dept: PHYSICAL THERAPY | Age: 75
Setting detail: THERAPIES SERIES
Discharge: HOME | End: 2025-03-20
Attending: FAMILY MEDICINE
Payer: MEDICARE

## 2025-03-20 PROCEDURE — 97110 THERAPEUTIC EXERCISES: CPT

## 2025-03-20 ASSESSMENT — PAIN SCALES - GENERAL: PAINLEVEL_OUTOF10: 4

## 2025-03-20 NOTE — PROGRESS NOTES
Pain:  2/10    Time In: 0859  Time Out: 0937  Timed Code Treatment Minutes: 38 Minutes  Total Treatment Time: 38 Minutes    Enrique Gillette, PTA     Date: 3/20/2025

## 2025-03-24 ENCOUNTER — HOSPITAL ENCOUNTER (OUTPATIENT)
Dept: PHYSICAL THERAPY | Age: 75
Setting detail: THERAPIES SERIES
Discharge: HOME | End: 2025-03-24
Attending: FAMILY MEDICINE
Payer: MEDICARE

## 2025-03-24 PROCEDURE — 97110 THERAPEUTIC EXERCISES: CPT

## 2025-03-24 PROCEDURE — 97140 MANUAL THERAPY 1/> REGIONS: CPT

## 2025-03-24 ASSESSMENT — PAIN SCALES - GENERAL: PAINLEVEL_OUTOF10: 4

## 2025-03-24 NOTE — PROGRESS NOTES
Phone: [phone]                 [facility]      Fax: [phone]                            Outpatient Physical Therapy                                                                            Daily Note    Date: 3/24/2025  Patient Name: David Paz        MRN: [id number]   ACCT#:  [id number]  : 1950  (74 y.o.)    Referring Provider (secondary): Rhina         Diagnosis: Pain of left sacroiliac joint  Treatment Diagnosis: Left SI dysfunction    Onset Date: 24  PT Insurance Information: Medicare  Total # of Visits Approved: 8 Per Physician Order  Total # of Visits to Date: 3  Plan of Care/Certification Expiration Date: 06/15/25    Pre-Treatment Pain:  4/10     Assessment  Assessment: Pt reports that the pain was better this weekend until he went down on his knee to do the hip flexor stretch on both sides, which increased the pain. No issues with the TB exercises. He reports he woke up in the middle of the night and had to go sleep in his chair because of pain. He says he sleeps better when he is reclined rather than lying flat. Pt reports a few instances of L sided pain when standing on the L side and performing exercises on the R side.    Plan  Continue with current plan of care    Exercises/Modalities/Manual:  See DocFlow Sheet    Education: Educated on hip flexor stretch w leg hanging over edge of bed            Goals  (Total # of Visits to Date: 3)   Short Term Goals  Time Frame for Short Term Goals: 4 visits  Short Term Goal 1: Patient will demonstrate compliance with HEP to optimize therapy progress    Long Term Goals  Time Frame for Long Term Goals : 8 visits  Long Term Goal 1: Patient will improve lumbar AROM to nil limitation in all planes  Long Term Goal 2: Patient will improve bilateralhip MMT to 5/5 in all planes to improve functional ability  Long Term Goal 3: Patient will improve Oswestry score from 22/50 to 10/50 to demonstrate functional improvement    Treatment

## 2025-03-27 ENCOUNTER — HOSPITAL ENCOUNTER (OUTPATIENT)
Dept: PHYSICAL THERAPY | Age: 75
Setting detail: THERAPIES SERIES
Discharge: HOME | End: 2025-03-27
Attending: FAMILY MEDICINE
Payer: MEDICARE

## 2025-03-27 PROCEDURE — 97110 THERAPEUTIC EXERCISES: CPT

## 2025-03-27 PROCEDURE — 97140 MANUAL THERAPY 1/> REGIONS: CPT

## 2025-03-27 ASSESSMENT — PAIN SCALES - GENERAL: PAINLEVEL_OUTOF10: 2

## 2025-03-27 NOTE — PROGRESS NOTES
Phone: [phone]                 [facility]      Fax: [phone]                            Outpatient Physical Therapy                                                                            Daily Note    Date: 3/27/2025  Patient Name: David Paz        MRN: [id number]   ACCT#:  [id number]  : 1950  (74 y.o.)    Referring Provider (secondary): Rhina         Diagnosis: Pain of left sacroiliac joint  Treatment Diagnosis: Left SI dysfunction    Onset Date: 24  PT Insurance Information: Medicare  Total # of Visits Approved: 8 Per Physician Order  Total # of Visits to Date: 4  Plan of Care/Certification Expiration Date: 06/15/25    Pre-Treatment Pain:  2/10  Subjective: 0930:  Feeling a little better.  Still tends to get pain starting about 4AM.  Pain was \"intense.\"  Had difficulty getting out of bed.  Assessment  Assessment: Good response to treatment.  Pain down to 1/10 afterward.  To work on stretching at home.  Did well with strength progressions.    Plan  Continue with current plan of care    Exercises/Modalities/Manual:  See DocFlow Sheet    Education: Education on progressions            Goals  (Total # of Visits to Date: 4)   Short Term Goals  Time Frame for Short Term Goals: 4 visits  Short Term Goal 1: Patient will demonstrate compliance with HEP to optimize therapy progress    Long Term Goals  Time Frame for Long Term Goals : 8 visits  Long Term Goal 1: Patient will improve lumbar AROM to nil limitation in all planes  Long Term Goal 2: Patient will improve bilateralhip MMT to 5/5 in all planes to improve functional ability  Long Term Goal 3: Patient will improve Oswestry score from 22/50 to 10/50 to demonstrate functional improvement    Treatment Tolerance:  Treatment Tolerance: Tolerated treatment well.    Post Treatment Pain:  1/10    Time In: 0930    Time Out : 1012        Timed Code Treatment Minutes: 42 Minutes  Total Treatment Time: 42 Minutes    Stephon Fajardo, PT

## 2025-03-31 ENCOUNTER — HOSPITAL ENCOUNTER (OUTPATIENT)
Dept: PHYSICAL THERAPY | Age: 75
Setting detail: THERAPIES SERIES
Discharge: HOME | End: 2025-03-31
Attending: FAMILY MEDICINE
Payer: MEDICARE

## 2025-03-31 PROCEDURE — 97110 THERAPEUTIC EXERCISES: CPT

## 2025-03-31 NOTE — PROGRESS NOTES
Phone: [phone]                 [facility]      Fax: [phone]                            Outpatient Physical Therapy                                                                            Daily Note    Date: 3/31/2025  Patient Name: David Paz        MRN: [id number]   ACCT#:  [id number]  : 1950  (74 y.o.)    Referring Provider (secondary): Rhina         Diagnosis: Pain of left sacroiliac joint  Treatment Diagnosis: Left SI dysfunction    Onset Date: 24  PT Insurance Information: Medicare  Total # of Visits Approved: 8 Per Physician Order  Total # of Visits to Date: 5  Plan of Care/Certification Expiration Date: 06/15/25    Pre-Treatment Pain:  0/10  Subjective: 1357:  Has been in pain in mornings for past 3 days.  Doing well on stairs as well.  Denies pain currently.  Assessment  Assessment: Held manual work due to lack of pain over past 3 days.  Progression of stretching and core work today with good tolerance.  If still painfree into next week, may d/c early.    Plan  Continue with current plan of care    Exercises/Modalities/Manual:  See DocFlow Sheet    Education: Education on progressions            Goals  (Total # of Visits to Date: 5)   Short Term Goals  Time Frame for Short Term Goals: 4 visits  Short Term Goal 1: Patient will demonstrate compliance with HEP to optimize therapy progress    Long Term Goals  Time Frame for Long Term Goals : 8 visits  Long Term Goal 1: Patient will improve lumbar AROM to nil limitation in all planes  Long Term Goal 2: Patient will improve bilateralhip MMT to 5/5 in all planes to improve functional ability  Long Term Goal 3: Patient will improve Oswestry score from 22/50 to 10/50 to demonstrate functional improvement    Treatment Tolerance:  Treatment Tolerance: Tolerated treatment well.    Post Treatment Pain:  0/10    Time In: 1357    Time Out : 1430        Timed Code Treatment Minutes: 33 Minutes  Total Treatment Time: 33

## 2025-04-03 ENCOUNTER — HOSPITAL ENCOUNTER (OUTPATIENT)
Dept: PHYSICAL THERAPY | Age: 75
Setting detail: THERAPIES SERIES
Discharge: HOME | End: 2025-04-03
Attending: FAMILY MEDICINE
Payer: MEDICARE

## 2025-04-03 PROCEDURE — 97110 THERAPEUTIC EXERCISES: CPT

## 2025-04-03 NOTE — PROGRESS NOTES
Phone: [phone]                 [facility]      Fax: [phone]                            Outpatient Physical Therapy                                                                            Daily Note    Date: 4/3/2025  Patient Name: David Paz        MRN: [id number]   ACCT#:  [id number]  : 1950  (74 y.o.)    Referring Provider (secondary): Rhina         Diagnosis: Pain of left sacroiliac joint  Treatment Diagnosis: Left SI dysfunction    Onset Date: 24  PT Insurance Information: Medicare  Total # of Visits Approved: 8 Per Physician Order  Total # of Visits to Date: 6  Plan of Care/Certification Expiration Date: 06/15/25    Pre-Treatment Pain:  0/10  Subjective: 1013:  Painfree currently.  \"I had a little bit of a twinge this morning when doing my knee bends.  Still a lot better than before.\"  Assessment  Assessment: Held manual work again today due to lack of pain.  Denies symptoms throughout session.  Will put patient on hold for 2 weeks to see how he does independently.    Plan  Place pt on hold    Exercises/Modalities/Manual:  See DocFlow Sheet    Education: Education on progression of exercises            Goals  (Total # of Visits to Date: 6)   Short Term Goals  Time Frame for Short Term Goals: 4 visits  Short Term Goal 1: Patient will demonstrate compliance with HEP to optimize therapy progress    Long Term Goals  Time Frame for Long Term Goals : 8 visits  Long Term Goal 1: Patient will improve lumbar AROM to nil limitation in all planes  Long Term Goal 2: Patient will improve bilateralhip MMT to 5/5 in all planes to improve functional ability  Long Term Goal 3: Patient will improve Oswestry score from 22/50 to 10/50 to demonstrate functional improvement    Treatment Tolerance:  Treatment Tolerance: Tolerated treatment well.    Post Treatment Pain:  0/10    Time In: 1013    Time Out : 1046        Timed Code Treatment Minutes: 33 Minutes  Total Treatment Time: 33

## 2025-04-07 ENCOUNTER — APPOINTMENT (OUTPATIENT)
Dept: PHYSICAL THERAPY | Age: 75
End: 2025-04-07
Attending: FAMILY MEDICINE
Payer: MEDICARE

## 2025-04-10 ENCOUNTER — APPOINTMENT (OUTPATIENT)
Dept: PHYSICAL THERAPY | Age: 75
End: 2025-04-10
Attending: FAMILY MEDICINE
Payer: MEDICARE

## 2025-04-18 ENCOUNTER — TRANSCRIBE ORDERS (OUTPATIENT)
Dept: ADMINISTRATIVE | Age: 75
End: 2025-04-18

## 2025-04-18 DIAGNOSIS — E23.6 PITUITARY MASS: Primary | ICD-10-CM

## 2025-04-18 DIAGNOSIS — E03.9 HYPOTHYROIDISM, UNSPECIFIED TYPE: ICD-10-CM

## 2025-04-18 DIAGNOSIS — D35.2 PITUITARY MICROADENOMA WITH HYPERPROLACTINEMIA (HCC): ICD-10-CM

## 2025-04-18 DIAGNOSIS — E22.9 PITUITARY MICROADENOMA WITH HYPERPROLACTINEMIA (HCC): ICD-10-CM

## 2025-06-11 ENCOUNTER — HOSPITAL ENCOUNTER (OUTPATIENT)
Age: 75
Discharge: HOME | End: 2025-06-11
Payer: MEDICARE

## 2025-06-11 LAB
FSH SERPL-ACNC: 29.7 MIU/ML (ref 1.5–12.4)
LH SERPL-ACNC: 14.7 MIU/ML (ref 1.7–8.6)
TSH SERPL DL<=0.05 MIU/L-ACNC: 1.21 UIU/ML (ref 0.27–4.2)

## 2025-06-11 PROCEDURE — 36415 COLL VENOUS BLD VENIPUNCTURE: CPT

## 2025-06-11 PROCEDURE — 84443 ASSAY THYROID STIM HORMONE: CPT

## 2025-06-11 PROCEDURE — 83001 ASSAY OF GONADOTROPIN (FSH): CPT

## 2025-06-11 PROCEDURE — 83002 ASSAY OF GONADOTROPIN (LH): CPT

## 2025-08-18 RX ORDER — PANTOPRAZOLE SODIUM 40 MG/1
40 TABLET, DELAYED RELEASE ORAL DAILY
Qty: 90 TABLET | Refills: 3 | Status: SHIPPED | OUTPATIENT
Start: 2025-08-18

## (undated) DEVICE — YANKAUER SUCTION INSTRUMENT NO CONTROL VENT, BULB TIP, CLEAR: Brand: YANKAUER

## (undated) DEVICE — CONMED ACCESSORY ELECTRODE, 3/16" (5 MM) BALL: Brand: CONMED

## (undated) DEVICE — CONMED ACCESSORY ELECTRODE, NEEDLE ELECTRODE: Brand: CONMED

## (undated) DEVICE — REAGENT TEST UREASE RAPD CLOTEST F/

## (undated) DEVICE — BLOCK BITE AD OPN SZ 48FR MOUTHPC ENDOSCP STURDY W/ FOAM

## (undated) DEVICE — ENDO KIT W/SYRINGE: Brand: MEDLINE INDUSTRIES, INC.

## (undated) DEVICE — SOLUTION IV IRRIG POUR BRL 0.9% SODIUM CHL 2F7124

## (undated) DEVICE — BITE BLK ENDOSCP AD 54FR GRN POLYETH ENDOSCP W STRP SLD

## (undated) DEVICE — FORCEP SPEC RETRV BX AD 2 MMX155 CM 5 MM GI OVL CUP W/ NDL

## (undated) DEVICE — NDLCTR: FOAM/MAG 40CT 64/CS: Brand: MEDICAL ACTION INDUSTRIES

## (undated) DEVICE — GLOVE,SURG,SENSICARE SLT,LF,PF,7.5: Brand: MEDLINE

## (undated) DEVICE — CHLORAPREP 26ML ORANGE

## (undated) DEVICE — FORCEPS BX L240CM JAW DIA2.2MM RAD JAW 4 HOT DISP

## (undated) DEVICE — TOWEL,OR,DSP,ST,BLUE,STD,4/PK,20PK/CS: Brand: MEDLINE

## (undated) DEVICE — GAUZE,SPONGE,4"X4",16PLY,XRAY,STRL,LF: Brand: MEDLINE

## (undated) DEVICE — MEDI-VAC NON-CONDUCTIVE SUCTION TUBING 6MM X 6.1M (20 FT.) L: Brand: CARDINAL HEALTH

## (undated) DEVICE — CLEANER ES TIP W2XL2IN ADH BK RADPQ FOR S STL ELECTRD

## (undated) DEVICE — 1200CC SUCTION CANISTER WITH HYDROPHOBIC FILTER AND RED LID: Brand: BEMIS

## (undated) DEVICE — ESOPHAGEAL BALLOON DILATATION CATHETER: Brand: CRE FIXED WIRE

## (undated) DEVICE — SYRINGE INFL 60ML DISP ALLIANCE II

## (undated) DEVICE — SOLUTION IRRIG 1000ML STRL H2O USP PLAS POUR BTL

## (undated) DEVICE — NEEDLE HYPO 22GA L1IN BLK POLYPR HUB S STL REG BVL STR W/O

## (undated) DEVICE — ELECTRODE ES AD CRD L15FT DISP FOR PT BELOW 30LB REM

## (undated) DEVICE — INTENDED FOR TISSUE SEPARATION, AND OTHER PROCEDURES THAT REQUIRE A SHARP SURGICAL BLADE TO PUNCTURE OR CUT.: Brand: BARD-PARKER ® CARBON RIB-BACK BLADES

## (undated) DEVICE — FORCEPS BX L240CM JAW DIA22MM ORNG STD CAP W NDL RAD JAW 4

## (undated) DEVICE — GOWN,AURORA,NONRNF,XL,30/CS: Brand: MEDLINE

## (undated) DEVICE — Device: Brand: DEFENDO VALVE AND CONNECTOR KIT

## (undated) DEVICE — HYBRID TUBING WITH CO2 CONNECTION FOR OLYMPUS 140/160/180/190 SERIES GI ENDOSCOPES WITH OLYMPUS AFU-100 , OLYMPUS OFP: Brand: ENDOGATOR HYBRID IRRIGATION TUBING

## (undated) DEVICE — JELLY LUBRICATING 4OZ FLIP TOP TB E Z

## (undated) DEVICE — 4-PORT MANIFOLD: Brand: NEPTUNE 2

## (undated) DEVICE — TIP ELECSURG BOVIE

## (undated) DEVICE — NEEDLE HYPO 18GA L1IN PNK POLYPR HUB S STL REG BVL STR W/O

## (undated) DEVICE — SYRINGE MED 10ML LUERLOCK TIP W/O SFTY DISP

## (undated) DEVICE — SKIN MARKER,REGULAR TIP WITH RULER: Brand: DEVON